# Patient Record
Sex: MALE | Race: WHITE | NOT HISPANIC OR LATINO | Employment: OTHER | URBAN - NONMETROPOLITAN AREA
[De-identification: names, ages, dates, MRNs, and addresses within clinical notes are randomized per-mention and may not be internally consistent; named-entity substitution may affect disease eponyms.]

---

## 2017-05-23 ENCOUNTER — OFFICE VISIT (OUTPATIENT)
Dept: CARDIOLOGY | Facility: CLINIC | Age: 72
End: 2017-05-23

## 2017-05-23 VITALS
HEART RATE: 72 BPM | DIASTOLIC BLOOD PRESSURE: 76 MMHG | WEIGHT: 174 LBS | SYSTOLIC BLOOD PRESSURE: 118 MMHG | HEIGHT: 72 IN | BODY MASS INDEX: 23.57 KG/M2

## 2017-05-23 DIAGNOSIS — Z98.890 S/P MITRAL VALVE REPAIR: ICD-10-CM

## 2017-05-23 DIAGNOSIS — E11.9 CONTROLLED TYPE 2 DIABETES MELLITUS WITHOUT COMPLICATION, WITHOUT LONG-TERM CURRENT USE OF INSULIN (HCC): ICD-10-CM

## 2017-05-23 DIAGNOSIS — E78.00 PURE HYPERCHOLESTEROLEMIA: ICD-10-CM

## 2017-05-23 DIAGNOSIS — I34.0 NON-RHEUMATIC MITRAL REGURGITATION: ICD-10-CM

## 2017-05-23 DIAGNOSIS — I10 ESSENTIAL HYPERTENSION: ICD-10-CM

## 2017-05-23 DIAGNOSIS — I34.2 NON-RHEUMATIC MITRAL VALVE STENOSIS: ICD-10-CM

## 2017-05-23 DIAGNOSIS — I25.10 CORONARY ARTERY DISEASE INVOLVING NATIVE CORONARY ARTERY OF NATIVE HEART WITHOUT ANGINA PECTORIS: Primary | ICD-10-CM

## 2017-05-23 PROCEDURE — 99213 OFFICE O/P EST LOW 20 MIN: CPT | Performed by: NURSE PRACTITIONER

## 2017-05-23 RX ORDER — NITROGLYCERIN 0.4 MG/1
0.4 TABLET SUBLINGUAL
COMMUNITY
End: 2018-05-30 | Stop reason: SDUPTHER

## 2017-09-25 ENCOUNTER — TELEPHONE (OUTPATIENT)
Dept: CARDIOLOGY | Facility: CLINIC | Age: 72
End: 2017-09-25

## 2017-09-25 NOTE — TELEPHONE ENCOUNTER
Wife called concerned about patient's BP. Says that his BP has been running low for the past couple of weeks (102-110/40's-50's and HR in the 50's-60's). Says since his BP has been running lower he has been having some dizziness. He has not been taking the Toprol. Takes Imdur 30 mg BID and Maxzide 37.5-25 daily. Denies chest pain. Is asking for sooner appointment. Any recommendations?     [phone]

## 2017-09-25 NOTE — TELEPHONE ENCOUNTER
Spoke with wife. Advised to hold Maxzide at this time and monitor BP. Advised to call back in a few days and let us know how BP has been running.

## 2017-09-28 ENCOUNTER — TELEPHONE (OUTPATIENT)
Dept: CARDIOLOGY | Facility: CLINIC | Age: 72
End: 2017-09-28

## 2017-09-28 NOTE — TELEPHONE ENCOUNTER
Agree to stop Maxzide for now. Continue to monitor vital signs periodically and call for problems.

## 2017-09-28 NOTE — TELEPHONE ENCOUNTER
Received call from patient's spouse stating was instructed to hold Maxzide 9/25/17 due to low b/p and b/p has been coming up is better this am 119/ 55 pulse 55, wants to know if is to continue to hold maxzide? What are your recommendation's?     See telephone encounter 9/25/17.

## 2017-09-29 NOTE — TELEPHONE ENCOUNTER
Called and notified patient's spouse to stop Maxzide for now and continue to monitor vital sign's periodically and call our office with any problem's. Verbalizes understanding.

## 2017-11-28 ENCOUNTER — OFFICE VISIT (OUTPATIENT)
Dept: CARDIOLOGY | Facility: CLINIC | Age: 72
End: 2017-11-28

## 2017-11-28 VITALS
HEART RATE: 56 BPM | DIASTOLIC BLOOD PRESSURE: 72 MMHG | WEIGHT: 179 LBS | HEIGHT: 72 IN | BODY MASS INDEX: 24.24 KG/M2 | SYSTOLIC BLOOD PRESSURE: 120 MMHG

## 2017-11-28 DIAGNOSIS — R42 DIZZINESS: ICD-10-CM

## 2017-11-28 DIAGNOSIS — E78.49 OTHER HYPERLIPIDEMIA: ICD-10-CM

## 2017-11-28 DIAGNOSIS — E11.9 TYPE 2 DIABETES MELLITUS WITHOUT COMPLICATION, WITHOUT LONG-TERM CURRENT USE OF INSULIN (HCC): ICD-10-CM

## 2017-11-28 DIAGNOSIS — I25.118 CORONARY ARTERY DISEASE INVOLVING NATIVE CORONARY ARTERY OF NATIVE HEART WITH OTHER FORM OF ANGINA PECTORIS (HCC): ICD-10-CM

## 2017-11-28 DIAGNOSIS — R00.2 PALPITATIONS: ICD-10-CM

## 2017-11-28 DIAGNOSIS — I25.6 SILENT MYOCARDIAL ISCHEMIA: ICD-10-CM

## 2017-11-28 DIAGNOSIS — I49.3 PVC (PREMATURE VENTRICULAR CONTRACTION): ICD-10-CM

## 2017-11-28 DIAGNOSIS — I20.8 ANGINAL EQUIVALENT (HCC): Primary | ICD-10-CM

## 2017-11-28 PROCEDURE — 99214 OFFICE O/P EST MOD 30 MIN: CPT | Performed by: NURSE PRACTITIONER

## 2017-11-28 PROCEDURE — 93000 ELECTROCARDIOGRAM COMPLETE: CPT | Performed by: NURSE PRACTITIONER

## 2017-11-28 NOTE — PROGRESS NOTES
Chief Complaint   Patient presents with   • Follow-up     cardiac managment, patient reports b/p has been dropping too low during the day and going up at night.  reports some better since maxide discontinued but is having  some edema at times.   • Chest Pain     reports chest pressure that goes up chest and into shoulder's and head especially with sudden position changes. Patient has sleep apnea but does not wear C-Pap at night.        Cardiac Complaints  chest pressure/discomfort and dyspnea, dizziness      Subjective   Burak Balbuena is a 72 y.o. male with HTN,DM,  mitral valve disease, and  IHD diagnosed in 2003 when he had stenting.  This was followed by mitral valve repair in 2004. His most recent stress in 2011 was negative and no ischemia was noted.  In November 2016 echocardiogram showed stable mitral valve repair.  He returns today for follow up and states he has been continuing to have issues with his blood pressure dropping low during the day sometimes and often high at night.  He and his wife report since maxide has been discontinued a bit more edema has been noticed but nothing severe.  He does have issues with chest pressure that he feels goes into his chest and shoulders and often feels worse with certain position.  He continues to have shortness of breath with exertion but no worse than prior and he does not wear CPAP at night.  Palpitations are denied.  Labs and refills he reports with PCP.      Cardiac History  Past Surgical History:   Procedure Laterality Date   • CARDIAC CATHETERIZATION  01/09/2004    EF 65%, 4 +MR, patent stent LCX   • CARDIAC CATHETERIZATION  01/19/2004    80% LCX- 2.5x13 & 2.5x8 Pixel Stents   • CARDIOVASCULAR STRESS TEST  12/12/2011    EF 65%, 6 min 7 sec, 78% THR, 160/62, no ischemia   • CORONARY ARTERY BYPASS GRAFT  03/08/2004    s/p mitral valve repair   • ECHO - CONVERTED  12/12/2011    EF 60-65%, MVA 1.7, RSVP 39   • ECHO - CONVERTED  11/08/2016    EF 60-65%, mitral  annular ring in place, Mild to mod MS, mild MR       Current Outpatient Prescriptions   Medication Sig Dispense Refill   • aspirin 81 MG EC tablet Take 81 mg by mouth Daily.     • atorvastatin (LIPITOR) 20 MG tablet Take 20 mg by mouth Daily.     • diazePAM (VALIUM) 5 MG tablet Take 5 mg by mouth At Night As Needed for anxiety.     • isosorbide mononitrate (IMDUR) 30 MG 24 hr tablet Take 30 mg by mouth 2 (Two) Times a Day.     • metFORMIN (GLUCOPHAGE) 1000 MG tablet Take 1,000 mg by mouth 2 (Two) Times a Day With Meals.     • metoprolol succinate XL (TOPROL-XL) 50 MG 24 hr tablet Take 50 mg by mouth Daily As Needed.     • nitroglycerin (NITROSTAT) 0.4 MG SL tablet Place 0.4 mg under the tongue Every 5 (Five) Minutes As Needed for Chest Pain. Take no more than 3 doses in 15 minutes.     • omeprazole (priLOSEC) 20 MG capsule Take 20 mg by mouth Daily.     • piroxicam (FELDENE) 20 MG capsule Take 20 mg by mouth Daily.     • potassium chloride (K-DUR) 10 MEQ CR tablet Take 10 mEq by mouth Daily.     • triamterene-hydrochlorothiazide (MAXZIDE-25) 37.5-25 MG per tablet Take 1 tablet by mouth Daily.       No current facility-administered medications for this visit.        Phenergan [promethazine hcl]    Past Medical History:   Diagnosis Date   • CAD (coronary artery disease)    • Diabetes Mellitus    • Hx of Intravertebral disc disease    • Hypertension    • MR (congenital mitral regurgitation)    • Overnight pulse oximetry 02/20/2009    Increase O2, sleep study.   • Robotic Prostatectomy    • Sleep study- [place] 03/04/2009       Social History     Social History   • Marital status:      Spouse name: N/A   • Number of children: N/A   • Years of education: N/A     Occupational History   • Not on file.     Social History Main Topics   • Smoking status: Never Smoker   • Smokeless tobacco: Never Used   • Alcohol use Yes      Comment: rarely   • Drug use: No   • Sexual activity: Not on file     Other Topics Concern   • Not  "on file     Social History Narrative       Family History   Problem Relation Age of Onset   • Heart attack Mother    • Heart disease Mother    • Diabetes Mother    • Cancer Father    • Heart attack Brother    • Heart disease Brother        Review of Systems   Constitution: Negative for weakness and malaise/fatigue.   Cardiovascular: Positive for chest pain and dyspnea on exertion. Negative for irregular heartbeat, near-syncope, palpitations and syncope.   Respiratory: Positive for shortness of breath. Negative for wheezing.    Musculoskeletal: Negative for arthritis and back pain.   Gastrointestinal: Negative for anorexia, heartburn and nausea.   Genitourinary: Negative for dysuria, hesitancy and nocturia.   Neurological: Positive for dizziness. Negative for light-headedness and loss of balance.   Psychiatric/Behavioral: Negative for altered mental status and depression.       DiabetesYes  Thyroidnormal    Objective     /72 (BP Location: Left arm)  Pulse 56  Ht 72\" (182.9 cm)  Wt 179 lb (81.2 kg)  BMI 24.28 kg/m2    Physical Exam   Constitutional: He is oriented to person, place, and time. He appears well-developed and well-nourished.   HENT:   Head: Normocephalic and atraumatic.   Eyes: EOM are normal. Pupils are equal, round, and reactive to light.   Neck: Normal range of motion. Neck supple.   Cardiovascular: Normal rate.  A regularly irregular rhythm present.   Murmur heard.  Pulmonary/Chest: Effort normal and breath sounds normal.   Abdominal: Soft.   Musculoskeletal: Normal range of motion.   Neurological: He is alert and oriented to person, place, and time.   Skin: Skin is warm and dry.   Psychiatric: He has a normal mood and affect. His behavior is normal.         ECG 12 Lead  Date/Time: 11/28/2017 2:06 PM  Performed by: ANA LILIA BURGOS  Authorized by: ANA LILIA BURGOS   Rhythm: sinus rhythm  Ectopy: frequent PVCs  BPM: 70  Clinical impression: abnormal ECG            Assessment/Plan     HR is " slightly bradycardic today at 56.  BP is stable at 120/72.  He was advised to continue to hold his maxzide therapy for now as his blood pressure has been doing well without it.  If it should start to drop again, patient advised to call so medication changes may be considered. Repeat cardiac workup with stress and echo will be advised today to assess for any silent ischemic burden, any runs of SVT, to assess LV function, and MVA. Holter monitor will be advised to assess percentage of PVCs as patient does have PVCs on EKG in office that was done today done for irregular pulse.  It was noted, he does not perfuse with PVCS, and continues to report dizziness some of the time.  More recommendations to follow. Lab order will be given with more recommendations to follow. No refills are needed at present. Good cardiac diet and activity as tolerated advised.  6 month follow up advised or sooner if needed.      Problems Addressed this Visit        Cardiovascular and Mediastinum    Hyperlipemia    Relevant Orders    Lipid Panel    CAD (coronary artery disease)    Relevant Orders    Stress Test With Myocardial Perfusion One Day    Adult Transthoracic Echo Complete W/ Cont if Necessary Per Protocol    TSH    Comprehensive Metabolic Panel    Magnesium    CBC & Differential    Lipid Panel       Endocrine    Diabetes    Relevant Orders    Stress Test With Myocardial Perfusion One Day    Adult Transthoracic Echo Complete W/ Cont if Necessary Per Protocol    TSH    Magnesium    CBC & Differential    Lipid Panel    Hemoglobin A1c      Other Visit Diagnoses     Anginal equivalent    -  Primary    Relevant Orders    Stress Test With Myocardial Perfusion One Day    Adult Transthoracic Echo Complete W/ Cont if Necessary Per Protocol    Hemoglobin A1c    Silent myocardial ischemia        Relevant Orders    Stress Test With Myocardial Perfusion One Day    Adult Transthoracic Echo Complete W/ Cont if Necessary Per Protocol    TSH     Comprehensive Metabolic Panel    Magnesium    CBC & Differential    Lipid Panel    PVC (premature ventricular contraction)        Relevant Orders    TSH    Comprehensive Metabolic Panel    Magnesium    Holter Monitor - 24 Hour    ECG 12 Lead    Palpitations        Relevant Orders    Holter Monitor - 24 Hour                  Electronically signed by DEONTE Hilton November 28, 2017 4:33 PM

## 2017-12-06 ENCOUNTER — HOSPITAL ENCOUNTER (OUTPATIENT)
Dept: CARDIOLOGY | Facility: HOSPITAL | Age: 72
Discharge: HOME | End: 2017-12-06

## 2017-12-06 ENCOUNTER — OUTSIDE FACILITY SERVICE (OUTPATIENT)
Dept: CARDIOLOGY | Facility: CLINIC | Age: 72
End: 2017-12-06

## 2017-12-06 DIAGNOSIS — I25.6 SILENT MYOCARDIAL ISCHEMIA: ICD-10-CM

## 2017-12-06 DIAGNOSIS — I25.118 CORONARY ARTERY DISEASE INVOLVING NATIVE CORONARY ARTERY OF NATIVE HEART WITH OTHER FORM OF ANGINA PECTORIS (HCC): ICD-10-CM

## 2017-12-06 DIAGNOSIS — E11.9 TYPE 2 DIABETES MELLITUS WITHOUT COMPLICATION, WITHOUT LONG-TERM CURRENT USE OF INSULIN (HCC): ICD-10-CM

## 2017-12-06 DIAGNOSIS — I20.8 ANGINAL EQUIVALENT (HCC): ICD-10-CM

## 2017-12-06 LAB
MAXIMAL PREDICTED HEART RATE: 146 BPM
MAXIMAL PREDICTED HEART RATE: 146 BPM
STRESS TARGET HR: 124 BPM
STRESS TARGET HR: 124 BPM

## 2017-12-06 PROCEDURE — A9500 TC99M SESTAMIBI: HCPCS | Performed by: INTERNAL MEDICINE

## 2017-12-06 PROCEDURE — 0 TECHNETIUM SESTAMIBI: Performed by: INTERNAL MEDICINE

## 2017-12-06 PROCEDURE — 93017 CV STRESS TEST TRACING ONLY: CPT

## 2017-12-06 PROCEDURE — 93306 TTE W/DOPPLER COMPLETE: CPT

## 2017-12-06 PROCEDURE — 93306 TTE W/DOPPLER COMPLETE: CPT | Performed by: INTERNAL MEDICINE

## 2017-12-06 PROCEDURE — 78452 HT MUSCLE IMAGE SPECT MULT: CPT

## 2017-12-06 PROCEDURE — 93018 CV STRESS TEST I&R ONLY: CPT | Performed by: INTERNAL MEDICINE

## 2017-12-06 PROCEDURE — 78452 HT MUSCLE IMAGE SPECT MULT: CPT | Performed by: INTERNAL MEDICINE

## 2017-12-06 RX ADMIN — TECHNETIUM TC-99M SESTAMIBI 1 DOSE: 1 INJECTION INTRAVENOUS at 08:50

## 2017-12-06 RX ADMIN — TECHNETIUM TC-99M SESTAMIBI 1 DOSE: 1 INJECTION INTRAVENOUS at 13:30

## 2017-12-07 ENCOUNTER — TELEPHONE (OUTPATIENT)
Dept: CARDIOLOGY | Facility: CLINIC | Age: 72
End: 2017-12-07

## 2017-12-07 NOTE — TELEPHONE ENCOUNTER
I spoke with patient's wife, Jaimee.  Aware of stress test and echo results and recommendations.    Negative for ischemia, normal LV function.  Continue home medications.

## 2017-12-18 ENCOUNTER — OUTSIDE FACILITY SERVICE (OUTPATIENT)
Dept: CARDIOLOGY | Facility: CLINIC | Age: 72
End: 2017-12-18

## 2017-12-18 PROCEDURE — 93227 XTRNL ECG REC<48 HR R&I: CPT | Performed by: INTERNAL MEDICINE

## 2018-01-16 ENCOUNTER — TELEPHONE (OUTPATIENT)
Dept: CARDIOLOGY | Facility: CLINIC | Age: 73
End: 2018-01-16

## 2018-01-16 DIAGNOSIS — E83.42 HYPOMAGNESEMIA: Primary | ICD-10-CM

## 2018-01-16 RX ORDER — SOTALOL HYDROCHLORIDE 80 MG/1
40 TABLET ORAL EVERY 12 HOURS SCHEDULED
Qty: 45 TABLET | Refills: 1 | Status: SHIPPED | OUTPATIENT
Start: 2018-01-16 | End: 2018-05-30 | Stop reason: SINTOL

## 2018-01-16 NOTE — TELEPHONE ENCOUNTER
Talked with Dr. Sotomayor.  He said for him to switch his metoprolol to sotalol at 40mg BID.  Have him come back on Thursday for EKG and blood pressure check.  I will also send order to our lab for magnesium.

## 2018-01-16 NOTE — TELEPHONE ENCOUNTER
"Patients wife Jaimee called concerning results of holter monitor, she reports B/P has been low, yesterday 133/47 and he is still having episodes of \"quick beats\".  What is your recommendations? Thanks   "

## 2018-01-16 NOTE — TELEPHONE ENCOUNTER
Patients wife Jaimee made aware of recommendations to switch Metoprolol to Sotalol 40mg bid, have magnesium level, and EKG with B/P check on Thursday 1/18/18 at 12, Jaimee verbalized understanding and requesting lab order be sent to [facility] lab.

## 2018-01-18 ENCOUNTER — TELEPHONE (OUTPATIENT)
Dept: CARDIOLOGY | Facility: CLINIC | Age: 73
End: 2018-01-18

## 2018-01-18 ENCOUNTER — CLINICAL SUPPORT (OUTPATIENT)
Dept: CARDIOLOGY | Facility: CLINIC | Age: 73
End: 2018-01-18

## 2018-01-18 DIAGNOSIS — I49.3 PVC (PREMATURE VENTRICULAR CONTRACTION): Primary | ICD-10-CM

## 2018-01-18 PROCEDURE — 93000 ELECTROCARDIOGRAM COMPLETE: CPT | Performed by: NURSE PRACTITIONER

## 2018-01-18 NOTE — TELEPHONE ENCOUNTER
EKG looks good.  Appears to be a NSR with some sinus arrhythmia/junctional beats.  For now, continue with current sotalol dosing.  Please encourage on follow up at lab for repeat MAG.

## 2018-01-18 NOTE — PROGRESS NOTES
Procedure     ECG 12 Lead  Date/Time: 1/18/2018 3:19 PM  Performed by: ANA LILIA BURGOS  Authorized by: ANA LILIA BURGOS   Rhythm: sinus rhythm  BPM: 64  Clinical impression: abnormal ECG and dysrhythmia - atrial  Comments: Sinus arrythmia

## 2018-01-18 NOTE — TELEPHONE ENCOUNTER
Patient wife aware of EKG results and to continue same Sotalol dose.   Patient had blood drawn this afternoon

## 2018-02-14 ENCOUNTER — TELEPHONE (OUTPATIENT)
Dept: CARDIOLOGY | Facility: CLINIC | Age: 73
End: 2018-02-14

## 2018-02-14 NOTE — TELEPHONE ENCOUNTER
Patients wife Jaimee called requesting most recent labs, stress, and echo results be sent to PCP. Copy of labs, stress and echo sent to PCP.

## 2018-05-30 ENCOUNTER — OFFICE VISIT (OUTPATIENT)
Dept: CARDIOLOGY | Facility: CLINIC | Age: 73
End: 2018-05-30

## 2018-05-30 ENCOUNTER — TELEPHONE (OUTPATIENT)
Dept: CARDIOLOGY | Facility: CLINIC | Age: 73
End: 2018-05-30

## 2018-05-30 VITALS
BODY MASS INDEX: 25.06 KG/M2 | HEIGHT: 72 IN | DIASTOLIC BLOOD PRESSURE: 68 MMHG | WEIGHT: 185 LBS | HEART RATE: 70 BPM | SYSTOLIC BLOOD PRESSURE: 100 MMHG

## 2018-05-30 DIAGNOSIS — E78.00 PURE HYPERCHOLESTEROLEMIA: ICD-10-CM

## 2018-05-30 DIAGNOSIS — I49.3 PVC (PREMATURE VENTRICULAR CONTRACTION): ICD-10-CM

## 2018-05-30 DIAGNOSIS — Z79.899 MEDICATION MANAGEMENT: ICD-10-CM

## 2018-05-30 DIAGNOSIS — I25.10 CORONARY ARTERY DISEASE INVOLVING NATIVE CORONARY ARTERY OF NATIVE HEART WITHOUT ANGINA PECTORIS: Primary | ICD-10-CM

## 2018-05-30 DIAGNOSIS — I10 ESSENTIAL HYPERTENSION: ICD-10-CM

## 2018-05-30 DIAGNOSIS — I49.1 PAC (PREMATURE ATRIAL CONTRACTION): ICD-10-CM

## 2018-05-30 DIAGNOSIS — Z98.890 S/P MITRAL VALVE REPAIR: ICD-10-CM

## 2018-05-30 DIAGNOSIS — R01.1 CARDIAC MURMUR: ICD-10-CM

## 2018-05-30 PROCEDURE — 93000 ELECTROCARDIOGRAM COMPLETE: CPT | Performed by: NURSE PRACTITIONER

## 2018-05-30 PROCEDURE — 99213 OFFICE O/P EST LOW 20 MIN: CPT | Performed by: NURSE PRACTITIONER

## 2018-05-30 RX ORDER — UBIDECARENONE 100 MG
100 CAPSULE ORAL DAILY
Start: 2018-05-30 | End: 2018-11-29 | Stop reason: ALTCHOICE

## 2018-05-30 RX ORDER — NITROGLYCERIN 0.4 MG/1
0.4 TABLET SUBLINGUAL
Qty: 25 TABLET | Refills: 1 | Status: SHIPPED | OUTPATIENT
Start: 2018-05-30 | End: 2022-02-24 | Stop reason: SDUPTHER

## 2018-05-30 NOTE — PATIENT INSTRUCTIONS
Nitroglycerin sublingual tablets  What is this medicine?  NITROGLYCERIN (jason troe GLI ser in) is a type of vasodilator. It relaxes blood vessels, increasing the blood and oxygen supply to your heart. This medicine is used to relieve chest pain caused by angina. It is also used to prevent chest pain before activities like climbing stairs, going outdoors in cold weather, or sexual activity.  This medicine may be used for other purposes; ask your health care provider or pharmacist if you have questions.  COMMON BRAND NAME(S): Nitroquick, Nitrostat, Nitrotab  What should I tell my health care provider before I take this medicine?  They need to know if you have any of these conditions:  -anemia  -head injury, recent stroke, or bleeding in the brain  -liver disease  -previous heart attack  -an unusual or allergic reaction to nitroglycerin, other medicines, foods, dyes, or preservatives  -pregnant or trying to get pregnant  -breast-feeding  How should I use this medicine?  Take this medicine by mouth as needed. At the first sign of an angina attack (chest pain or tightness) place one tablet under your tongue. You can also take this medicine 5 to 10 minutes before an event likely to produce chest pain. Follow the directions on the prescription label. Let the tablet dissolve under the tongue. Do not swallow whole. Replace the dose if you accidentally swallow it. It will help if your mouth is not dry. Saliva around the tablet will help it to dissolve more quickly. Do not eat or drink, smoke or chew tobacco while a tablet is dissolving. If you are not better within 5 minutes after taking ONE dose of nitroglycerin, call 9-1-1 immediately to seek emergency medical care. Do not take more than 3 nitroglycerin tablets over 15 minutes.  If you take this medicine often to relieve symptoms of angina, your doctor or health care professional may provide you with different instructions to manage your symptoms. If symptoms do not go away  after following these instructions, it is important to call 9-1-1 immediately. Do not take more than 3 nitroglycerin tablets over 15 minutes.  Talk to your pediatrician regarding the use of this medicine in children. Special care may be needed.  Overdosage: If you think you have taken too much of this medicine contact a poison control center or emergency room at once.  NOTE: This medicine is only for you. Do not share this medicine with others.  What if I miss a dose?  This does not apply. This medicine is only used as needed.  What may interact with this medicine?  Do not take this medicine with any of the following medications:  -certain migraine medicines like ergotamine and dihydroergotamine (DHE)  -medicines used to treat erectile dysfunction like sildenafil, tadalafil, and vardenafil  -riociguat  This medicine may also interact with the following medications:  -alteplase  -aspirin  -heparin  -medicines for high blood pressure  -medicines for mental depression  -other medicines used to treat angina  -phenothiazines like chlorpromazine, mesoridazine, prochlorperazine, thioridazine  This list may not describe all possible interactions. Give your health care provider a list of all the medicines, herbs, non-prescription drugs, or dietary supplements you use. Also tell them if you smoke, drink alcohol, or use illegal drugs. Some items may interact with your medicine.  What should I watch for while using this medicine?  Tell your doctor or health care professional if you feel your medicine is no longer working.  Keep this medicine with you at all times. Sit or lie down when you take your medicine to prevent falling if you feel dizzy or faint after using it. Try to remain calm. This will help you to feel better faster. If you feel dizzy, take several deep breaths and lie down with your feet propped up, or bend forward with your head resting between your knees.  You may get drowsy or dizzy. Do not drive, use machinery,  or do anything that needs mental alertness until you know how this drug affects you. Do not stand or sit up quickly, especially if you are an older patient. This reduces the risk of dizzy or fainting spells. Alcohol can make you more drowsy and dizzy. Avoid alcoholic drinks.  Do not treat yourself for coughs, colds, or pain while you are taking this medicine without asking your doctor or health care professional for advice. Some ingredients may increase your blood pressure.  What side effects may I notice from receiving this medicine?  Side effects that you should report to your doctor or health care professional as soon as possible:  -blurred vision  -dry mouth  -skin rash  -sweating  -the feeling of extreme pressure in the head  -unusually weak or tired  Side effects that usually do not require medical attention (report to your doctor or health care professional if they continue or are bothersome):  -flushing of the face or neck  -headache  -irregular heartbeat, palpitations  -nausea, vomiting  This list may not describe all possible side effects. Call your doctor for medical advice about side effects. You may report side effects to FDA at 5-752-FDA-1332.  Where should I keep my medicine?  Keep out of the reach of children.  Store at room temperature between 20 and 25 degrees C (68 and 77 degrees F). Store in original container. Protect from light and moisture. Keep tightly closed. Throw away any unused medicine after the expiration date.  NOTE: This sheet is a summary. It may not cover all possible information. If you have questions about this medicine, talk to your doctor, pharmacist, or health care provider.  © 2018 [employer] (2014-10-16 17:57:36)

## 2018-05-30 NOTE — PROGRESS NOTES
Chief Complaint   Patient presents with   • Follow-up     For cardiac management. PCP refills meds. Labs per our office 6 months ago. Stopped taking Sotalol about 5 months ago due to side effects. Didn't bring med list- went over verbally.        Subjective       Burak Balbuena is a 73 y.o. male with HTN, DM, mitral valve disease, and  IHD diagnosed in 2003 when he had stenting.  This was followed by mitral valve repair in 2004. In 2011, stress test was negative for ischemia.  In November 2016, echocardiogram showed stable mitral valve repair. At his December 2017 visit, he reported issues with blood pressure management, shortness of breath and some edema. Holter report 11/17 showed sinus with PVCs noted. Sotalol added. A repeat stress and echocardiogram were done on 12/6/17. No ischemia was noted and effort tolerance normal. LVEF was 60-65% and mitral annular ring stable. RVSP 38 mmHg. Recommendation to continue medical management. He reports he was not able to tolerate Sotalol and it was stopped. No reoccurrence of palpitations reported.   Today he comes to the office for a follow up visit and denies chest pain, palpitations, dizziness or worsening shortness of breath. He states he has more fatigue than he used to have but attributes more to age. He continues his normal activities without issues. He decided to not grow a garden this year but still maintains yard work and wood working.     HPI     Cardiac History:    Past Surgical History:   Procedure Laterality Date   • CARDIAC CATHETERIZATION  01/09/2004    EF 65%, 4 +MR, patent stent LCX   • CARDIAC CATHETERIZATION  01/19/2004    80% LCX- 2.5x13 & 2.5x8 Pixel Stents   • CARDIOVASCULAR STRESS TEST  12/12/2011    EF 65%, 6 min 7 sec, 78% THR, 160/62, no ischemia   • CARDIOVASCULAR STRESS TEST  12/06/2017    8 Min,10 Secs. 82% THR. BP- 188/61. Negative   • CORONARY ARTERY BYPASS GRAFT  03/08/2004    s/p mitral valve repair   • ECHO - CONVERTED  12/12/2011    EF  60-65%, MVA 1.7, RSVP 39   • ECHO - CONVERTED  11/08/2016    EF 60-65%, mitral annular ring in place, Mild to mod MS, mild MR   • ECHO - CONVERTED  12/06/2017    EF 65%. Mild MS & MR. RVSP- 38 mmHg       Current Outpatient Prescriptions   Medication Sig Dispense Refill   • aspirin 81 MG EC tablet Take 81 mg by mouth Daily.     • atorvastatin (LIPITOR) 20 MG tablet Take 20 mg by mouth Daily.     • diazePAM (VALIUM) 5 MG tablet Take 5 mg by mouth At Night As Needed for anxiety.     • isosorbide mononitrate (IMDUR) 30 MG 24 hr tablet Take 30 mg by mouth 2 (Two) Times a Day.     • metFORMIN (GLUCOPHAGE) 1000 MG tablet Take 1,000 mg by mouth 2 (Two) Times a Day With Meals.     • nitroglycerin (NITROSTAT) 0.4 MG SL tablet Place 1 tablet under the tongue Every 5 (Five) Minutes As Needed for Chest Pain. Take no more than 3 doses in 15 minutes. 25 tablet 1   • omeprazole (priLOSEC) 20 MG capsule Take 20 mg by mouth Daily.     • piroxicam (FELDENE) 20 MG capsule Take 20 mg by mouth Daily.     • potassium chloride (K-DUR) 10 MEQ CR tablet Take 10 mEq by mouth Daily.     • triamterene-hydrochlorothiazide (MAXZIDE-25) 37.5-25 MG per tablet Take 1 tablet by mouth Daily As Needed.     • coenzyme Q10 100 MG capsule Take 1 capsule by mouth Daily.       No current facility-administered medications for this visit.        Phenergan [promethazine hcl]    Past Medical History:   Diagnosis Date   • CAD (coronary artery disease)    • Diabetes Mellitus    • Hx of Intravertebral disc disease    • Hypertension    • MR (congenital mitral regurgitation)    • Overnight pulse oximetry 02/20/2009    Increase O2, sleep study.   • Robotic Prostatectomy    • Sleep study- [place] 03/04/2009       Social History     Social History   • Marital status:      Spouse name: N/A   • Number of children: N/A   • Years of education: N/A     Occupational History   • Not on file.     Social History Main Topics   • Smoking status: Never Smoker   • Smokeless  "tobacco: Never Used   • Alcohol use Yes      Comment: rarely   • Drug use: No   • Sexual activity: Not on file     Other Topics Concern   • Not on file     Social History Narrative   • No narrative on file       Family History   Problem Relation Age of Onset   • Heart attack Mother    • Heart disease Mother    • Diabetes Mother    • Cancer Father    • Heart attack Brother    • Heart disease Brother        Review of Systems   Constitution: Positive for malaise/fatigue (no worse). Negative for decreased appetite and weakness.   HENT: Negative for congestion, hoarse voice, nosebleeds and sore throat.    Eyes: Negative for blurred vision.   Cardiovascular: Negative for chest pain, leg swelling, near-syncope, orthopnea, palpitations and paroxysmal nocturnal dyspnea.   Respiratory: Negative for shortness of breath, sleep disturbances due to breathing and snoring.    Endocrine: Negative for cold intolerance and heat intolerance.   Hematologic/Lymphatic: Negative for bleeding problem. Does not bruise/bleed easily.   Skin: Negative for color change, dry skin and itching.   Musculoskeletal: Negative for muscle cramps and myalgias.   Gastrointestinal: Negative for abdominal pain, change in bowel habit, dysphagia, heartburn, melena and nausea.   Genitourinary: Negative for dysuria and hematuria.   Neurological: Negative for dizziness, headaches and light-headedness.   Psychiatric/Behavioral: Negative for altered mental status and memory loss. The patient does not have insomnia.    Allergic/Immunologic: Positive for environmental allergies. Negative for hives.        Objective     /68   Pulse 70   Ht 182.9 cm (72\")   Wt 83.9 kg (185 lb)   BMI 25.09 kg/m²     Physical Exam   Constitutional: He is oriented to person, place, and time. Vital signs are normal. He appears well-developed and well-nourished. He does not appear ill. No distress.   HENT:   Head: Normocephalic.   Eyes: Pupils are equal, round, and reactive to " light.   Neck: Normal range of motion. Neck supple. No JVD present. Carotid bruit is not present.   Cardiovascular: Normal rate, regular rhythm, S1 normal and S2 normal.    No murmur heard.  Pulmonary/Chest: Effort normal and breath sounds normal. He has no wheezes. He has no rales.   Abdominal: Soft. Bowel sounds are normal. He exhibits no distension. There is no tenderness.   Musculoskeletal: Normal range of motion. He exhibits no edema or tenderness.   Neurological: He is alert and oriented to person, place, and time.   Skin: Skin is warm and dry.   Psychiatric: He has a normal mood and affect.          ECG 12 Lead  Date/Time: 5/30/2018 2:11 PM  Performed by: CATHIE BEST  Authorized by: CATHIE BEST   Comparison: compared with previous ECG from 2/2/2018  Comparison to previous ECG: Sinus arrhythmia   Rhythm: sinus rhythm  Ectopy: atrial premature contractions  Rate: normal  BPM: 70  Comments: One PAC noted            Assessment/Plan      Burak was seen today for follow-up.    Diagnoses and all orders for this visit:    Coronary artery disease involving native coronary artery of native heart without angina pectoris    Essential hypertension    Pure hypercholesterolemia    S/P mitral valve repair    Cardiac murmur    PAC (premature atrial contraction)    PVC (premature ventricular contraction)    Other orders  -     nitroglycerin (NITROSTAT) 0.4 MG SL tablet; Place 1 tablet under the tongue Every 5 (Five) Minutes As Needed for Chest Pain. Take no more than 3 doses in 15 minutes.  -     coenzyme Q10 100 MG capsule; Take 1 capsule by mouth Daily.  -     ECG 12 Lead    Agree for him to continue one a day vitamin for men. He stopped statin about a month ago due to myalgia, which has since improved. Recommend adding Co Q 10 and restart statin. He has plans to follow up with you in near future for repeat labs.     Patient's Body mass index is 25.09 kg/m². BMI is within normal parameters. No follow-up required.  Heart healthy diet encouraged. He understands to avoid caffeine and maintain adequate hydration.     The reports of his most recent Holter monitor, stress and echo reviewed. No ischemia was noted. He had tried Sotalol for management of PVCs as noted on Holter report, but was not able to tolerate. EKG today shows sinus with one PAC noted. He denies palpitations or worsening cardiac symptoms. We will continue with current medication regimen. He takes Maxzide only as needed for increased blood pressure, about once a week according to patient. He remains off Metoprolol. I did not advise restarting due to improved symptoms and vital signs normal.   Mitral valve was stable per echo. No further cardiac testing warranted at this time. Should symptoms or problems develop he understands to call. A 6 month follow up visit scheduled.            Electronically signed by DEONTE Hunter,  May 30, 2018 2:16 PM

## 2018-11-29 ENCOUNTER — OFFICE VISIT (OUTPATIENT)
Dept: CARDIOLOGY | Facility: CLINIC | Age: 73
End: 2018-11-29

## 2018-11-29 VITALS
SYSTOLIC BLOOD PRESSURE: 118 MMHG | WEIGHT: 174 LBS | DIASTOLIC BLOOD PRESSURE: 64 MMHG | BODY MASS INDEX: 23.57 KG/M2 | HEIGHT: 72 IN | HEART RATE: 68 BPM

## 2018-11-29 DIAGNOSIS — I10 ESSENTIAL HYPERTENSION: ICD-10-CM

## 2018-11-29 DIAGNOSIS — I49.3 PVC (PREMATURE VENTRICULAR CONTRACTION): ICD-10-CM

## 2018-11-29 DIAGNOSIS — E78.00 PURE HYPERCHOLESTEROLEMIA: ICD-10-CM

## 2018-11-29 DIAGNOSIS — E11.9 TYPE 2 DIABETES MELLITUS WITHOUT COMPLICATION, WITHOUT LONG-TERM CURRENT USE OF INSULIN (HCC): ICD-10-CM

## 2018-11-29 DIAGNOSIS — I25.10 CORONARY ARTERY DISEASE INVOLVING NATIVE CORONARY ARTERY OF NATIVE HEART WITHOUT ANGINA PECTORIS: Primary | ICD-10-CM

## 2018-11-29 DIAGNOSIS — R01.1 CARDIAC MURMUR: ICD-10-CM

## 2018-11-29 DIAGNOSIS — Z98.890 S/P MITRAL VALVE REPAIR: ICD-10-CM

## 2018-11-29 DIAGNOSIS — I49.1 PAC (PREMATURE ATRIAL CONTRACTION): ICD-10-CM

## 2018-11-29 PROCEDURE — 99213 OFFICE O/P EST LOW 20 MIN: CPT | Performed by: NURSE PRACTITIONER

## 2018-11-29 RX ORDER — METOPROLOL SUCCINATE 50 MG/1
50 TABLET, EXTENDED RELEASE ORAL DAILY PRN
COMMUNITY
End: 2018-11-29 | Stop reason: SINTOL

## 2019-05-29 ENCOUNTER — OFFICE VISIT (OUTPATIENT)
Dept: CARDIOLOGY | Facility: CLINIC | Age: 74
End: 2019-05-29

## 2019-05-29 VITALS
HEART RATE: 74 BPM | DIASTOLIC BLOOD PRESSURE: 72 MMHG | SYSTOLIC BLOOD PRESSURE: 140 MMHG | BODY MASS INDEX: 23.49 KG/M2 | WEIGHT: 173.4 LBS | HEIGHT: 72 IN

## 2019-05-29 DIAGNOSIS — I25.10 CORONARY ARTERY DISEASE INVOLVING NATIVE CORONARY ARTERY OF NATIVE HEART WITHOUT ANGINA PECTORIS: Primary | ICD-10-CM

## 2019-05-29 DIAGNOSIS — E78.00 PURE HYPERCHOLESTEROLEMIA: ICD-10-CM

## 2019-05-29 DIAGNOSIS — Z98.890 S/P MITRAL VALVE REPAIR: ICD-10-CM

## 2019-05-29 DIAGNOSIS — R01.1 HEART MURMUR: ICD-10-CM

## 2019-05-29 DIAGNOSIS — I10 ESSENTIAL HYPERTENSION: ICD-10-CM

## 2019-05-29 PROCEDURE — 99213 OFFICE O/P EST LOW 20 MIN: CPT | Performed by: NURSE PRACTITIONER

## 2019-05-29 RX ORDER — CHLORAL HYDRATE 500 MG
1000 CAPSULE ORAL
COMMUNITY
End: 2019-12-17 | Stop reason: ALTCHOICE

## 2019-05-29 NOTE — PROGRESS NOTES
Chief Complaint   Patient presents with   • Follow-up     for cardiac management . Has no cardiac complaints today    • Aspirin     Daily 81 mg dose       Subjective       Burak Balbuena is a 74 y.o. male  with HTN, DM, mitral valve disease, and IHD diagnosed in 2003 when he had stenting followed by mitral valve repair in 2004. In 2011, stress test was negative for ischemia. In November 2016, echocardiogram showed stable mitral valve repair. At his December 2017 visit, he reported difficulty with blood pressure, SOB and edema. Holter showed sinus with PVCs noted. Sotalol added. A repeat stress and echocardiogram were done on 12/6/17. No ischemia was noted and effort tolerance normal. LVEF was 60-65% and mitral annular ring stable. RVSP 38 mmHg. Recommendation to continue medical management. He reports he was not able to tolerate Sotalol and it was stopped. No reoccurrence of palpitations reported, therefore metoprolol not restarted.     Today he comes to the office for a follow up visit.  He denies chest pain, palpitations, shortness of breath.  He is maintaining his normal daily activities.  He has no longer taking potassium as he said most recent level was elevated.  No other medication changes are noted.    HPI     Cardiac History:    Past Surgical History:   Procedure Laterality Date   • CARDIAC CATHETERIZATION  01/09/2004    EF 65%, 4 +MR, patent stent LCX   • CARDIAC CATHETERIZATION  01/19/2004    80% LCX- 2.5x13 & 2.5x8 Pixel Stents   • CARDIOVASCULAR STRESS TEST  12/12/2011    EF 65%, 6 min 7 sec, 78% THR, 160/62, no ischemia   • CARDIOVASCULAR STRESS TEST  12/06/2017    8 Min,10 Secs. 82% THR. BP- 188/61. Negative   • CONVERTED (HISTORICAL) HOLTER  12/18/2017    Holter- 24 hr- avg 66, freq PVC, 4.5%   • CORONARY ARTERY BYPASS GRAFT  03/08/2004    s/p mitral valve repair   • ECHO - CONVERTED  12/12/2011    EF 60-65%, MVA 1.7, RSVP 39   • ECHO - CONVERTED  11/08/2016    EF 60-65%, mitral annular ring in  place, Mild to mod MS, mild MR   • ECHO - CONVERTED  12/06/2017    EF 65%. Mild MS & MR. RVSP- 38 mmHg       Current Outpatient Medications   Medication Sig Dispense Refill   • aspirin 81 MG EC tablet Take 81 mg by mouth Daily.     • atorvastatin (LIPITOR) 20 MG tablet Take 20 mg by mouth Daily.     • diazePAM (VALIUM) 5 MG tablet Take 5 mg by mouth At Night As Needed for anxiety.     • isosorbide mononitrate (IMDUR) 30 MG 24 hr tablet Take 30 mg by mouth 2 (Two) Times a Day.     • metFORMIN (GLUCOPHAGE) 1000 MG tablet Take 1,000 mg by mouth 2 (Two) Times a Day With Meals.     • Multiple Vitamins-Minerals (MENS MULTI VITAMIN & MINERAL) tablet Take 1 tablet by mouth Daily.     • nitroglycerin (NITROSTAT) 0.4 MG SL tablet Place 1 tablet under the tongue Every 5 (Five) Minutes As Needed for Chest Pain. Take no more than 3 doses in 15 minutes. 25 tablet 1   • Omega-3 Fatty Acids (FISH OIL) 1000 MG capsule capsule Take 1,000 mg by mouth Daily With Breakfast.     • omeprazole (priLOSEC) 20 MG capsule Take 20 mg by mouth Daily.     • piroxicam (FELDENE) 20 MG capsule Take 20 mg by mouth Daily.     • triamterene-hydrochlorothiazide (MAXZIDE-25) 37.5-25 MG per tablet Take 1 tablet by mouth Daily As Needed.       No current facility-administered medications for this visit.        Phenergan [promethazine hcl]    Past Medical History:   Diagnosis Date   • CAD (coronary artery disease)    • Diabetes Mellitus    • Hx of Intravertebral disc disease    • Hypertension    • MR (congenital mitral regurgitation)    • Overnight pulse oximetry 02/20/2009    Increase O2, sleep study.   • Robotic Prostatectomy    • Sleep study- [facility] 03/04/2009       Social History     Socioeconomic History   • Marital status:      Spouse name: Not on file   • Number of children: Not on file   • Years of education: Not on file   • Highest education level: Not on file   Tobacco Use   • Smoking status: Never Smoker   • Smokeless tobacco: Never Used  "  Substance and Sexual Activity   • Alcohol use: Yes     Comment: rarely   • Drug use: No       Family History   Problem Relation Age of Onset   • Heart attack Mother    • Heart disease Mother    • Diabetes Mother    • Cancer Father    • Heart attack Brother    • Heart disease Brother        Review of Systems   Constitution: Negative for decreased appetite and malaise/fatigue.   HENT: Negative for congestion, hoarse voice and nosebleeds.    Eyes: Negative for blurred vision and redness.   Cardiovascular: Negative for chest pain, leg swelling and palpitations.   Respiratory: Negative for cough and shortness of breath.    Endocrine: Negative for cold intolerance and heat intolerance.   Hematologic/Lymphatic: Does not bruise/bleed easily.   Skin: Negative for color change, dry skin and itching.   Musculoskeletal: Positive for joint pain (not a new problem). Negative for muscle cramps and myalgias.   Gastrointestinal: Negative for abdominal pain, change in bowel habit, dysphagia, heartburn, melena and nausea.   Genitourinary: Negative for dysuria and hematuria.   Neurological: Negative for dizziness and light-headedness.   Psychiatric/Behavioral: Negative for altered mental status. The patient does not have insomnia.         Objective     /72 (BP Location: Left arm)   Pulse 74   Ht 182.9 cm (72\")   Wt 78.7 kg (173 lb 6.4 oz)   BMI 23.52 kg/m²     Physical Exam   Constitutional: He is oriented to person, place, and time. He appears well-nourished.   HENT:   Head: Normocephalic.   Eyes: Pupils are equal, round, and reactive to light.   Neck: Normal range of motion.   Cardiovascular: Normal rate, regular rhythm, S1 normal and S2 normal.   Murmur heard.  Pulmonary/Chest: Breath sounds normal.   Abdominal: Soft. Bowel sounds are normal.   Musculoskeletal: Normal range of motion.   Neurological: He is alert and oriented to person, place, and time.   Skin: Skin is warm.   Psychiatric: He has a normal mood and affect. "      Procedures: none today        Assessment/Plan      Burak was seen today for follow-up and aspirin.    Diagnoses and all orders for this visit:    Coronary artery disease involving native coronary artery of native heart without angina pectoris    Essential hypertension    Pure hypercholesterolemia    Heart murmur    S/P mitral valve repair    Mr. Balbuena presents today without cardiac concerns or complaints.  His blood pressure, heart rate, and heart rhythm today are normal.  No change in cardiac medications made at this time.    For management of cholesterol he is currently on Lipitor without issues noted.  He will follow with you for lab orders/management.  Please forward a copy of lab results as available.    Patient's Body mass index is 23.52 kg/m². BMI is within normal parameters. No follow-up required.  Continue heart healthy diet.     His last cardiac work-up in 2017 was stable.  No repeat testing advised at this time as he remains asymptomatic.  Cardiac murmur appears stable.  A 6-month follow-up visit scheduled.  Please call sooner for any cardiac concerns.           Electronically signed by DEONTE Hunter,  May 31, 2019 11:30 AM

## 2019-12-17 ENCOUNTER — OFFICE VISIT (OUTPATIENT)
Dept: CARDIOLOGY | Facility: CLINIC | Age: 74
End: 2019-12-17

## 2019-12-17 VITALS
DIASTOLIC BLOOD PRESSURE: 72 MMHG | HEART RATE: 64 BPM | BODY MASS INDEX: 23.43 KG/M2 | WEIGHT: 173 LBS | SYSTOLIC BLOOD PRESSURE: 118 MMHG | HEIGHT: 72 IN

## 2019-12-17 DIAGNOSIS — R01.1 CARDIAC MURMUR: ICD-10-CM

## 2019-12-17 DIAGNOSIS — I10 ESSENTIAL HYPERTENSION: ICD-10-CM

## 2019-12-17 DIAGNOSIS — Z98.890 S/P MITRAL VALVE REPAIR: ICD-10-CM

## 2019-12-17 DIAGNOSIS — I25.10 CORONARY ARTERY DISEASE INVOLVING NATIVE CORONARY ARTERY OF NATIVE HEART WITHOUT ANGINA PECTORIS: Primary | ICD-10-CM

## 2019-12-17 DIAGNOSIS — E11.69 TYPE 2 DIABETES MELLITUS WITH OTHER SPECIFIED COMPLICATION, WITHOUT LONG-TERM CURRENT USE OF INSULIN (HCC): ICD-10-CM

## 2019-12-17 DIAGNOSIS — E78.2 MIXED HYPERLIPIDEMIA: ICD-10-CM

## 2019-12-17 DIAGNOSIS — R00.2 PALPITATIONS: ICD-10-CM

## 2019-12-17 PROCEDURE — 99213 OFFICE O/P EST LOW 20 MIN: CPT | Performed by: NURSE PRACTITIONER

## 2019-12-17 RX ORDER — POTASSIUM CHLORIDE 750 MG/1
10 TABLET, FILM COATED, EXTENDED RELEASE ORAL DAILY
COMMUNITY
End: 2020-12-22 | Stop reason: SINTOL

## 2019-12-17 RX ORDER — LISINOPRIL 10 MG/1
10 TABLET ORAL AS NEEDED
COMMUNITY

## 2019-12-17 NOTE — PROGRESS NOTES
Chief Complaint   Patient presents with   • Follow-up     For cardiac management. Patient is on aspirin. Last lab work was done about 3 months ago per PCP, not in chart.    • Med Refill     PCP does medication refills. Brought medication list with visit.        Cardiac Complaints  none      Subjective   Burak Balbuena is a 74 y.o. male with HTN, DM, hyperlipidemia, mitral valve disease, and IHD diagnosed in 2003 when he had stenting followed by mitral valve repair in 2004. In 2011, stress test was negative for ischemia. In November 2016, echocardiogram showed stable mitral valve repair. At his December 2017 visit, he reported difficulty with blood pressure, SOB and edema. Holter showed sinus with PVCs noted. Sotalol added. A repeat stress and echocardiogram were done on 12/6/17. No ischemia was noted and effort tolerance normal. LVEF was 60-65% and mitral annular ring stable. RVSP 38 mmHg. Recommendation to continue medical management. He reported he was not able to tolerate Sotalol and it was stopped. No reoccurrence of palpitations reported, therefore metoprolol not restarted.     He returns today for follow up and reports doing well. He denies chest pain, shortness of breath, palpitations, fatigue, dizziness, and syncope. He remains active at home and feels like he can do his ADLs without concerns. Labs he admits remain followed by PCP and were most recently done 3 months ago, none available for review. No refills needed. He does admit sugar was slightly elevated on most recent labs but can not recall exact A1C.           Cardiac History  Past Surgical History:   Procedure Laterality Date   • CARDIAC CATHETERIZATION  01/09/2004    EF 65%, 4 +MR, patent stent LCX   • CARDIAC CATHETERIZATION  01/19/2004    80% LCX- 2.5x13 & 2.5x8 Pixel Stents   • CARDIOVASCULAR STRESS TEST  12/12/2011    EF 65%, 6 min 7 sec, 78% THR, 160/62, no ischemia   • CARDIOVASCULAR STRESS TEST  12/06/2017    8 Min,10 Secs. 82% THR. BP-  188/61. Negative   • CONVERTED (HISTORICAL) HOLTER  12/18/2017    Holter- 24 hr- avg 66, freq PVC, 4.5%   • CORONARY ARTERY BYPASS GRAFT  03/08/2004    s/p mitral valve repair   • ECHO - CONVERTED  12/12/2011    EF 60-65%, MVA 1.7, RSVP 39   • ECHO - CONVERTED  11/08/2016    EF 60-65%, mitral annular ring in place, Mild to mod MS, mild MR   • ECHO - CONVERTED  12/06/2017    EF 65%. Mild MS & MR. RVSP- 38 mmHg       Current Outpatient Medications   Medication Sig Dispense Refill   • aspirin 81 MG EC tablet Take 81 mg by mouth Daily.     • atorvastatin (LIPITOR) 20 MG tablet Take 20 mg by mouth Daily.     • diazePAM (VALIUM) 5 MG tablet Take 5 mg by mouth At Night As Needed for anxiety.     • isosorbide mononitrate (IMDUR) 30 MG 24 hr tablet Take 30 mg by mouth 2 (Two) Times a Day.     • lisinopril (PRINIVIL,ZESTRIL) 10 MG tablet Take 10 mg by mouth As Needed.     • metFORMIN (GLUCOPHAGE) 1000 MG tablet Take 1,000 mg by mouth 2 (Two) Times a Day With Meals.     • Multiple Vitamins-Minerals (MENS MULTI VITAMIN & MINERAL) tablet Take 1 tablet by mouth Daily.     • nitroglycerin (NITROSTAT) 0.4 MG SL tablet Place 1 tablet under the tongue Every 5 (Five) Minutes As Needed for Chest Pain. Take no more than 3 doses in 15 minutes. 25 tablet 1   • omeprazole (priLOSEC) 20 MG capsule Take 20 mg by mouth Daily.     • piroxicam (FELDENE) 20 MG capsule Take 20 mg by mouth Daily.     • potassium chloride (K-DUR) 10 MEQ CR tablet Take 10 mEq by mouth Daily.       No current facility-administered medications for this visit.        Phenergan [promethazine hcl]    Past Medical History:   Diagnosis Date   • CAD (coronary artery disease)    • Diabetes Mellitus    • Hx of Intravertebral disc disease    • Hypertension    • MR (congenital mitral regurgitation)    • Overnight pulse oximetry 02/20/2009    Increase O2, sleep study.   • Robotic Prostatectomy    • Sleep study- [place] 03/04/2009       Social History     Socioeconomic History   •  "Marital status:      Spouse name: Not on file   • Number of children: Not on file   • Years of education: Not on file   • Highest education level: Not on file   Tobacco Use   • Smoking status: Never Smoker   • Smokeless tobacco: Never Used   Substance and Sexual Activity   • Alcohol use: Yes     Comment: rarely   • Drug use: No       Family History   Problem Relation Age of Onset   • Heart attack Mother    • Heart disease Mother    • Diabetes Mother    • Cancer Father    • Heart attack Brother    • Heart disease Brother        Review of Systems   Constitution: Negative for malaise/fatigue and night sweats.   Cardiovascular: Negative for chest pain, claudication, dyspnea on exertion, irregular heartbeat, leg swelling, near-syncope, orthopnea, palpitations and syncope.   Respiratory: Negative for cough, shortness of breath and wheezing.    Musculoskeletal: Positive for stiffness. Negative for back pain, joint pain and joint swelling.   Gastrointestinal: Negative for anorexia, heartburn, melena, nausea and vomiting.   Genitourinary: Negative for dysuria, hematuria, hesitancy and nocturia.   Neurological: Negative for dizziness, light-headedness and loss of balance.   Psychiatric/Behavioral: Negative for depression and memory loss. The patient is not nervous/anxious.            Objective     /72 (BP Location: Left arm)   Pulse 64   Ht 182.9 cm (72.01\")   Wt 78.5 kg (173 lb)   BMI 23.46 kg/m²     Physical Exam   Constitutional: He is oriented to person, place, and time. He appears well-developed and well-nourished.   HENT:   Head: Normocephalic and atraumatic.   Eyes: Pupils are equal, round, and reactive to light. EOM are normal.   Neck: Normal range of motion. Neck supple.   Cardiovascular: Normal rate and regular rhythm.   Murmur heard.  Pulmonary/Chest: Effort normal and breath sounds normal.   Abdominal: Soft.   Musculoskeletal: Normal range of motion.   Neurological: He is alert and oriented to " person, place, and time.   Skin: Skin is warm and dry.   Psychiatric: He has a normal mood and affect. His behavior is normal.       Procedures    Assessment/Plan     HTN:  Well managed on current. No adjustment to current lisinopril therapy will be advised.     Palpitations:  Not reported. No additional BB or CCB added.    IHD:  Cardiac status appears stable. No concerns are voiced. ASA therapy continued at current as bleeding and bruising are denied. No repeat workup recommended. At next visit, repeat workup will be considered due to risk of silent ischemic burden, and already present IHD with stents.    Chronic angina:  Chest pain denied on current imdur therapy.    Hyperlipidemia: FLP monitored by your office. NO recent available but he thinks recent showed lipids are stable. No adjustment to current lipitor therapy recommended as tolerance reported.     Diabetes:  Managed with glucophage therapy.  Patient thinks most recent AIC was elevated. He will follow with you in regards and was urged to limit sugar consumption.    BMI noted at 23.46 and stable.  Good cardiac ADA diet with activity as tolerated advised. \    No refills currently needed.    6 month follow up recommended or sooner if needed.         Problems Addressed this Visit        Cardiovascular and Mediastinum    Hyperlipemia    HTN (hypertension)    Relevant Medications    lisinopril (PRINIVIL,ZESTRIL) 10 MG tablet    CAD (coronary artery disease) - Primary    Cardiac murmur       Endocrine    Diabetes (CMS/HCC)       Other    S/P mitral valve repair      Other Visit Diagnoses     Palpitations              Patient's Body mass index is 23.46 kg/m². BMI is within normal parameters. No follow-up required..                Electronically signed by DEONTE Hilton December 17, 2019 5:11 PM

## 2020-06-16 ENCOUNTER — TELEPHONE (OUTPATIENT)
Dept: CARDIOLOGY | Facility: CLINIC | Age: 75
End: 2020-06-16

## 2020-06-16 NOTE — TELEPHONE ENCOUNTER
Received fax from Dr. Stoner for cardiac clearance for patient to have teeth extractions under local anesthesia. Patient is on aspirin and they are requesting to hold for 5 days. According to our records, last stenting was done on 01/19/04 and had a mitral valve repair on 03/08/04.      Fax [phone]

## 2020-06-23 ENCOUNTER — OFFICE VISIT (OUTPATIENT)
Dept: CARDIOLOGY | Facility: CLINIC | Age: 75
End: 2020-06-23

## 2020-06-23 VITALS
HEART RATE: 72 BPM | WEIGHT: 164 LBS | BODY MASS INDEX: 22.21 KG/M2 | DIASTOLIC BLOOD PRESSURE: 70 MMHG | HEIGHT: 72 IN | SYSTOLIC BLOOD PRESSURE: 126 MMHG | TEMPERATURE: 97.1 F

## 2020-06-23 DIAGNOSIS — I49.3 PVC (PREMATURE VENTRICULAR CONTRACTION): ICD-10-CM

## 2020-06-23 DIAGNOSIS — I25.10 CORONARY ARTERY DISEASE INVOLVING NATIVE CORONARY ARTERY OF NATIVE HEART WITHOUT ANGINA PECTORIS: Primary | ICD-10-CM

## 2020-06-23 DIAGNOSIS — I10 ESSENTIAL HYPERTENSION: ICD-10-CM

## 2020-06-23 DIAGNOSIS — Z98.890 S/P MITRAL VALVE REPAIR: ICD-10-CM

## 2020-06-23 PROCEDURE — 99214 OFFICE O/P EST MOD 30 MIN: CPT | Performed by: NURSE PRACTITIONER

## 2020-06-23 RX ORDER — HYDROCHLOROTHIAZIDE 12.5 MG/1
12.5 TABLET ORAL DAILY PRN
COMMUNITY

## 2020-06-23 NOTE — PROGRESS NOTES
Chief Complaint   Patient presents with   • Follow-up     Cardiac management.   • Lab     Last labs about 6 months ago per PCP. PCP writes refills on medication.   • Fatigue     Having more weakness, he relates to having infection from teeth, to have extractions soon. Had some weight loss, had not been eating well due to needing extractions.     Fernanda Balbuena is a 75 y.o. male with HTN, DM, hyperlipidemia, mitral valve disease, and IHD diagnosed in 2003 when he had stenting followed by mitral valve repair in 2004. In 2011, stress test was negative for ischemia. In November 2016, echocardiogram showed stable mitral valve repair. At his December 2017 visit, he reported difficulty with blood pressure, SOB and edema. Holter showed sinus with PVCs noted. Sotalol added. A repeat stress and echocardiogram were done on 12/6/17. No ischemia was noted and effort tolerance normal. LVEF was 60-65% and mitral annular ring stable. RVSP 38 mmHg. Recommendation to continue medical management. He reported he was not able to tolerate Sotalol and it was stopped. No reoccurrence of palpitations reported, therefore metoprolol not restarted.      He came today for routine follow-up visit.  He denies having any chest pain.  He does admit to weakness and fatigue recently.  He has had difficulty with his teeth and planning to have them extracted soon with Dr. Judy uriarte.  He has not been able to eat properly and has lost about 9 pounds.  He does have mild dyspnea on exertion but not any worse than usual.  He is able to mow his yard without difficulty.  Cardiac clearance has been given for dental extractions.  Refills and labs managed by Dr. Morales.    HPI         Cardiac History:    Past Surgical History:   Procedure Laterality Date   • CARDIAC CATHETERIZATION  01/09/2004    EF 65%, 4 +MR, patent stent LCX   • CARDIAC CATHETERIZATION  01/19/2004    80% LCX- 2.5x13 & 2.5x8 Pixel Stents   • CARDIOVASCULAR STRESS TEST   12/12/2011    EF 65%, 6 min 7 sec, 78% THR, 160/62, no ischemia   • CARDIOVASCULAR STRESS TEST  12/06/2017    8 Min,10 Secs. 82% THR. BP- 188/61. Negative   • CONVERTED (HISTORICAL) HOLTER  12/18/2017    Holter- 24 hr- avg 66, freq PVC, 4.5%   • CORONARY ARTERY BYPASS GRAFT  03/08/2004    s/p mitral valve repair   • ECHO - CONVERTED  12/12/2011    EF 60-65%, MVA 1.7, RSVP 39   • ECHO - CONVERTED  11/08/2016    EF 60-65%, mitral annular ring in place, Mild to mod MS, mild MR   • ECHO - CONVERTED  12/06/2017    EF 65%. Mild MS & MR. RVSP- 38 mmHg       Current Outpatient Medications   Medication Sig Dispense Refill   • aspirin 81 MG EC tablet Take 81 mg by mouth Daily.     • atorvastatin (LIPITOR) 20 MG tablet Take 20 mg by mouth Daily.     • diazePAM (VALIUM) 5 MG tablet Take 5 mg by mouth At Night As Needed for anxiety.     • hydroCHLOROthiazide (HYDRODIURIL) 12.5 MG tablet Take 12.5 mg by mouth Daily.     • isosorbide mononitrate (IMDUR) 30 MG 24 hr tablet Take 30 mg by mouth 2 (Two) Times a Day.     • lisinopril (PRINIVIL,ZESTRIL) 10 MG tablet Take 10 mg by mouth As Needed.     • metFORMIN (GLUCOPHAGE) 1000 MG tablet Take 1,000 mg by mouth 2 (Two) Times a Day With Meals.     • Multiple Vitamins-Minerals (MENS MULTI VITAMIN & MINERAL) tablet Take 1 tablet by mouth Daily.     • nitroglycerin (NITROSTAT) 0.4 MG SL tablet Place 1 tablet under the tongue Every 5 (Five) Minutes As Needed for Chest Pain. Take no more than 3 doses in 15 minutes. 25 tablet 1   • omeprazole (priLOSEC) 20 MG capsule Take 20 mg by mouth Daily.     • piroxicam (FELDENE) 20 MG capsule Take 20 mg by mouth Daily.     • potassium chloride (K-DUR) 10 MEQ CR tablet Take 10 mEq by mouth Daily.       No current facility-administered medications for this visit.        Phenergan [promethazine hcl]    Past Medical History:   Diagnosis Date   • CAD (coronary artery disease)    • Diabetes Mellitus    • Hx of Intravertebral disc disease    • Hypertension     "  • MR (congenital mitral regurgitation)    • Overnight pulse oximetry 02/20/2009    Increase O2, sleep study.   • Robotic Prostatectomy    • Sleep study- [facility] 03/04/2009       Social History     Socioeconomic History   • Marital status:      Spouse name: Not on file   • Number of children: Not on file   • Years of education: Not on file   • Highest education level: Not on file   Tobacco Use   • Smoking status: Never Smoker   • Smokeless tobacco: Never Used   Substance and Sexual Activity   • Alcohol use: Yes     Comment: rarely   • Drug use: No       Family History   Problem Relation Age of Onset   • Heart attack Mother    • Heart disease Mother    • Diabetes Mother    • Cancer Father    • Heart attack Brother    • Heart disease Brother      Review of Systems   Constitution: Positive for weight loss (down 9 lb in six months ). Negative for decreased appetite and malaise/fatigue.   HENT: Negative.    Eyes: Negative.    Cardiovascular: Positive for palpitations (Occasionally). Negative for chest pain, dyspnea on exertion, leg swelling and syncope.   Respiratory: Negative for cough and shortness of breath.    Endocrine: Negative.    Hematologic/Lymphatic: Negative.    Skin: Negative.    Musculoskeletal: Positive for arthritis.   Gastrointestinal: Negative for abdominal pain and melena.   Genitourinary: Negative for hematuria.   Neurological: Negative for dizziness.   Psychiatric/Behavioral: Negative for altered mental status and depression.   Allergic/Immunologic: Negative.       Diabetes- No  Thyroid-normal    Objective     /70 (BP Location: Left arm)   Pulse 72   Temp 97.1 °F (36.2 °C)   Ht 182.9 cm (72.01\")   Wt 74.4 kg (164 lb)   BMI 22.24 kg/m²     Physical Exam   Constitutional: He is oriented to person, place, and time. He appears well-developed and well-nourished. No distress.   HENT:   Head: Normocephalic.   Eyes: Pupils are equal, round, and reactive to light.   Neck: Normal range of motion. "   Cardiovascular: Normal rate, regular rhythm and intact distal pulses.  Occasional extrasystoles are present.   Murmur heard.   Systolic murmur is present with a grade of 3/6 at the apex.  Pulmonary/Chest: Effort normal and breath sounds normal.   Abdominal: Soft. Bowel sounds are normal.   Musculoskeletal: Normal range of motion. He exhibits no edema.   Neurological: He is alert and oriented to person, place, and time.   Skin: Skin is warm and dry. He is not diaphoretic.   Psychiatric: He has a normal mood and affect.   Nursing note and vitals reviewed.     Procedures          Assessment/Plan      Burak was seen today for follow-up, lab and fatigue.    Diagnoses and all orders for this visit:    Coronary artery disease involving native coronary artery of native heart without angina pectoris  -     Adult Transthoracic Echo Complete W/ Cont if Necessary Per Protocol; Future    S/P mitral valve repair  -     Adult Transthoracic Echo Complete W/ Cont if Necessary Per Protocol; Future    PVC (premature ventricular contraction)  -     Adult Transthoracic Echo Complete W/ Cont if Necessary Per Protocol; Future    Essential hypertension  -     Adult Transthoracic Echo Complete W/ Cont if Necessary Per Protocol; Future    Heart rate and blood pressure stable.  Occasional ectopic beat noted on clinical exam, possibly PVC.  He is not bothered by this so we will continue to manage him conservatively.  We reviewed the findings of his last echocardiogram from 2017 showing mild mitral stenosis with mitral regurgitation, well-seated mitral valve ring.  Recommend repeating echocardiogram to reassess MVA, LVEF for routine surveillance.  Lipids have been managed with Lipitor 20 mg which he tolerates well.  Labs have been followed by Dr. Morales.  Patient reports good glucose control.  He appears stable at this time.  Further recommendations to follow echocardiogram.  As long as he does not have any anginal symptoms, will continue  to monitor.  He understands repeat stress test will be indicated if he develops any shortness of breath or chest discomfort.  Follow-up in 6 months.    Patient's Body mass index is 22.24 kg/m². BMI is within normal parameters. No follow-up required..               Electronically signed by DEONTE Ramsey,  June 23, 2020 16:39

## 2020-06-26 ENCOUNTER — HOSPITAL ENCOUNTER (OUTPATIENT)
Dept: CARDIOLOGY | Facility: HOSPITAL | Age: 75
Discharge: HOME | End: 2020-06-26
Admitting: NURSE PRACTITIONER

## 2020-06-26 VITALS — HEIGHT: 72 IN | WEIGHT: 164.02 LBS | BODY MASS INDEX: 22.22 KG/M2

## 2020-06-26 DIAGNOSIS — I25.10 CORONARY ARTERY DISEASE INVOLVING NATIVE CORONARY ARTERY OF NATIVE HEART WITHOUT ANGINA PECTORIS: ICD-10-CM

## 2020-06-26 DIAGNOSIS — I10 ESSENTIAL HYPERTENSION: ICD-10-CM

## 2020-06-26 DIAGNOSIS — I49.3 PVC (PREMATURE VENTRICULAR CONTRACTION): ICD-10-CM

## 2020-06-26 DIAGNOSIS — Z98.890 S/P MITRAL VALVE REPAIR: ICD-10-CM

## 2020-06-26 PROCEDURE — 93306 TTE W/DOPPLER COMPLETE: CPT | Performed by: INTERNAL MEDICINE

## 2020-06-26 PROCEDURE — 93306 TTE W/DOPPLER COMPLETE: CPT

## 2020-06-27 LAB
AORTIC DIMENSIONLESS INDEX: 1 (DI)
BH CV ECHO MEAS - ACS: 1.9 CM
BH CV ECHO MEAS - AO MAX PG (FULL): -0.07 MMHG
BH CV ECHO MEAS - AO MAX PG: 3.9 MMHG
BH CV ECHO MEAS - AO MEAN PG (FULL): 0 MMHG
BH CV ECHO MEAS - AO MEAN PG: 2 MMHG
BH CV ECHO MEAS - AO ROOT AREA (BSA CORRECTED): 2
BH CV ECHO MEAS - AO ROOT AREA: 11.9 CM^2
BH CV ECHO MEAS - AO ROOT DIAM: 3.9 CM
BH CV ECHO MEAS - AO V2 MAX: 99 CM/SEC
BH CV ECHO MEAS - AO V2 MEAN: 65.2 CM/SEC
BH CV ECHO MEAS - AO V2 VTI: 25.1 CM
BH CV ECHO MEAS - BSA(HAYCOCK): 1.9 M^2
BH CV ECHO MEAS - BSA: 2 M^2
BH CV ECHO MEAS - BZI_BMI: 22.2 KILOGRAMS/M^2
BH CV ECHO MEAS - BZI_METRIC_HEIGHT: 182.9 CM
BH CV ECHO MEAS - BZI_METRIC_WEIGHT: 74.4 KG
BH CV ECHO MEAS - EDV(CUBED): 85.2 ML
BH CV ECHO MEAS - EDV(TEICH): 87.7 ML
BH CV ECHO MEAS - EF(CUBED): 75.6 %
BH CV ECHO MEAS - EF(TEICH): 67.8 %
BH CV ECHO MEAS - ESV(CUBED): 20.8 ML
BH CV ECHO MEAS - ESV(TEICH): 28.3 ML
BH CV ECHO MEAS - FS: 37.5 %
BH CV ECHO MEAS - IVS/LVPW: 0.96
BH CV ECHO MEAS - IVSD: 0.87 CM
BH CV ECHO MEAS - LA DIMENSION: 3.8 CM
BH CV ECHO MEAS - LA/AO: 0.97
BH CV ECHO MEAS - LAT PEAK E' VEL: 9.3 CM/SEC
BH CV ECHO MEAS - LV IVRT: 0.12 SEC
BH CV ECHO MEAS - LV MASS(C)D: 126.7 GRAMS
BH CV ECHO MEAS - LV MASS(C)DI: 64.7 GRAMS/M^2
BH CV ECHO MEAS - LV MAX PG: 4 MMHG
BH CV ECHO MEAS - LV MEAN PG: 2 MMHG
BH CV ECHO MEAS - LV V1 MAX: 99.9 CM/SEC
BH CV ECHO MEAS - LV V1 MEAN: 62.5 CM/SEC
BH CV ECHO MEAS - LV V1 VTI: 25.4 CM
BH CV ECHO MEAS - LVIDD: 4.4 CM
BH CV ECHO MEAS - LVIDS: 2.8 CM
BH CV ECHO MEAS - LVPWD: 0.91 CM
BH CV ECHO MEAS - MED PEAK E' VEL: 12.3 CM/SEC
BH CV ECHO MEAS - MV A MAX VEL: 115 CM/SEC
BH CV ECHO MEAS - MV DEC SLOPE: 382 CM/SEC^2
BH CV ECHO MEAS - MV DEC TIME: 0.29 SEC
BH CV ECHO MEAS - MV E MAX VEL: 117 CM/SEC
BH CV ECHO MEAS - MV E/A: 1
BH CV ECHO MEAS - MV MAX PG: 6.1 MMHG
BH CV ECHO MEAS - MV MEAN PG: 3 MMHG
BH CV ECHO MEAS - MV P1/2T MAX VEL: 124 CM/SEC
BH CV ECHO MEAS - MV P1/2T: 95.1 MSEC
BH CV ECHO MEAS - MV V2 MAX: 123 CM/SEC
BH CV ECHO MEAS - MV V2 MEAN: 71.1 CM/SEC
BH CV ECHO MEAS - MV V2 VTI: 39.6 CM
BH CV ECHO MEAS - MVA P1/2T LCG: 1.8 CM^2
BH CV ECHO MEAS - MVA(P1/2T): 2.3 CM^2
BH CV ECHO MEAS - PA MAX PG (FULL): -1.1 MMHG
BH CV ECHO MEAS - PA MAX PG: 2.5 MMHG
BH CV ECHO MEAS - PA MEAN PG (FULL): -1 MMHG
BH CV ECHO MEAS - PA MEAN PG: 1 MMHG
BH CV ECHO MEAS - PA V2 MAX: 78.9 CM/SEC
BH CV ECHO MEAS - PA V2 MEAN: 50 CM/SEC
BH CV ECHO MEAS - PA V2 VTI: 14.4 CM
BH CV ECHO MEAS - RAP SYSTOLE: 10 MMHG
BH CV ECHO MEAS - RV MAX PG: 3.6 MMHG
BH CV ECHO MEAS - RV MEAN PG: 2 MMHG
BH CV ECHO MEAS - RV V1 MAX: 94.6 CM/SEC
BH CV ECHO MEAS - RV V1 MEAN: 57.9 CM/SEC
BH CV ECHO MEAS - RV V1 VTI: 22.1 CM
BH CV ECHO MEAS - RVDD: 3.3 CM
BH CV ECHO MEAS - RVSP: 31 MMHG
BH CV ECHO MEAS - SI(AO): 153.1 ML/M^2
BH CV ECHO MEAS - SI(CUBED): 32.9 ML/M^2
BH CV ECHO MEAS - SI(TEICH): 30.3 ML/M^2
BH CV ECHO MEAS - SV(AO): 299.8 ML
BH CV ECHO MEAS - SV(CUBED): 64.4 ML
BH CV ECHO MEAS - SV(TEICH): 59.4 ML
BH CV ECHO MEAS - TR MAX VEL: 225 CM/SEC
BH CV ECHO MEASUREMENTS AVERAGE E/E' RATIO: 10.83
MAXIMAL PREDICTED HEART RATE: 143 BPM
STRESS TARGET HR: 122 BPM

## 2020-12-22 ENCOUNTER — OFFICE VISIT (OUTPATIENT)
Dept: CARDIOLOGY | Facility: CLINIC | Age: 75
End: 2020-12-22

## 2020-12-22 VITALS
HEIGHT: 72 IN | SYSTOLIC BLOOD PRESSURE: 120 MMHG | TEMPERATURE: 98.4 F | DIASTOLIC BLOOD PRESSURE: 68 MMHG | WEIGHT: 165 LBS | HEART RATE: 68 BPM | BODY MASS INDEX: 22.35 KG/M2

## 2020-12-22 DIAGNOSIS — E78.2 MIXED HYPERLIPIDEMIA: ICD-10-CM

## 2020-12-22 DIAGNOSIS — I10 ESSENTIAL HYPERTENSION: ICD-10-CM

## 2020-12-22 DIAGNOSIS — I25.10 CORONARY ARTERY DISEASE INVOLVING NATIVE CORONARY ARTERY OF NATIVE HEART WITHOUT ANGINA PECTORIS: Primary | ICD-10-CM

## 2020-12-22 DIAGNOSIS — R01.1 CARDIAC MURMUR: ICD-10-CM

## 2020-12-22 DIAGNOSIS — Z98.890 S/P MITRAL VALVE REPAIR: ICD-10-CM

## 2020-12-22 PROCEDURE — 99213 OFFICE O/P EST LOW 20 MIN: CPT | Performed by: NURSE PRACTITIONER

## 2020-12-22 NOTE — PROGRESS NOTES
Chief Complaint   Patient presents with   • Follow-up     Cardiac management . Has no cardiac complaints today.    • Labs     Had labs per PCP recently, will call for results    • Med Refill     No refills needed today. Had med list with him today.       Fernanda Balbuena is a 75 y.o. male  with HTN, DM, hyperlipidemia, mitral valve disease, and IHD diagnosed in 2003 when he had stenting followed by mitral valve repair in 2004. In 2011, stress test was negative for ischemia. In November 2016, echocardiogram showed stable mitral valve repair. At his December 2017 visit, he reported difficulty with blood pressure, SOB and edema. Holter showed sinus with PVCs noted. Sotalol added. A repeat stress and echocardiogram were done on 12/6/17. No ischemia was noted and effort tolerance normal. LVEF was 60-65% and mitral annular ring stable. RVSP 38 mmHg. Recommendation to continue medical management. He reported he was not able to tolerate Sotalol and it was stopped. No reoccurrence of palpitations reported, therefore metoprolol not restarted.     Echocardiogram done 6/27/2020 showed LVEF of 61 to 65%, presence of mitral valve ring with mild mitral regurg.  RVSP was 31 mmHg.  Mild dilation of aortic root noted being 3.9 cm.    Today he comes to the office for a follow-up visit.  He denies chest pain or palpitations.  He admits to maintaining his normal daily activities without increased shortness of breath.  No recent swelling noted.  Overall he feels he is doing very well.    Cardiac History:    Past Surgical History:   Procedure Laterality Date   • CARDIAC CATHETERIZATION  01/09/2004    EF 65%, 4 +MR, patent stent LCX   • CARDIAC CATHETERIZATION  01/19/2004    80% LCX- 2.5x13 & 2.5x8 Pixel Stents   • CARDIOVASCULAR STRESS TEST  12/12/2011    EF 65%, 6 min 7 sec, 78% THR, 160/62, no ischemia   • CARDIOVASCULAR STRESS TEST  12/06/2017    8 Min,10 Secs. 82% THR. BP- 188/61. Negative   • CONVERTED (HISTORICAL)  HOLTER  12/18/2017    Holter- 24 hr- avg 66, freq PVC, 4.5%   • CORONARY ARTERY BYPASS GRAFT  03/08/2004    s/p mitral valve repair   • ECHO - CONVERTED  12/12/2011    EF 60-65%, MVA 1.7, RSVP 39   • ECHO - CONVERTED  11/08/2016    EF 60-65%, mitral annular ring in place, Mild to mod MS, mild MR   • ECHO - CONVERTED  12/06/2017    EF 65%. Mild MS & MR. RVSP- 38 mmHg   • ECHO - CONVERTED  06/27/2020    EF 65%. LA- 3.8 Cm. AO 3.9 Cm. MV Ring. Mild MR. RVSP- 31 mmHg.       Current Outpatient Medications   Medication Sig Dispense Refill   • aspirin 81 MG EC tablet Take 81 mg by mouth Daily.     • atorvastatin (LIPITOR) 20 MG tablet Take 20 mg by mouth Daily.     • diazePAM (VALIUM) 5 MG tablet Take 5 mg by mouth At Night As Needed for anxiety.     • hydroCHLOROthiazide (HYDRODIURIL) 12.5 MG tablet Take 12.5 mg by mouth Daily.     • isosorbide mononitrate (IMDUR) 30 MG 24 hr tablet Take 30 mg by mouth 2 (Two) Times a Day.     • lisinopril (PRINIVIL,ZESTRIL) 10 MG tablet Take 10 mg by mouth As Needed.     • metFORMIN (GLUCOPHAGE) 1000 MG tablet Take 1,000 mg by mouth 2 (Two) Times a Day With Meals.     • Multiple Vitamins-Minerals (MENS MULTI VITAMIN & MINERAL) tablet Take 1 tablet by mouth Daily.     • nitroglycerin (NITROSTAT) 0.4 MG SL tablet Place 1 tablet under the tongue Every 5 (Five) Minutes As Needed for Chest Pain. Take no more than 3 doses in 15 minutes. 25 tablet 1   • omeprazole (priLOSEC) 20 MG capsule Take 20 mg by mouth Daily.     • piroxicam (FELDENE) 20 MG capsule Take 20 mg by mouth Daily.       No current facility-administered medications for this visit.        Phenergan [promethazine hcl]    Past Medical History:   Diagnosis Date   • CAD (coronary artery disease)    • Diabetes Mellitus    • Hx of Intravertebral disc disease    • Hypertension    • MR (congenital mitral regurgitation)    • Overnight pulse oximetry 02/20/2009    Increase O2, sleep study.   • Robotic Prostatectomy    • Sleep study- [facility]  03/04/2009       Social History     Socioeconomic History   • Marital status:      Spouse name: Not on file   • Number of children: Not on file   • Years of education: Not on file   • Highest education level: Not on file   Tobacco Use   • Smoking status: Never Smoker   • Smokeless tobacco: Never Used   Substance and Sexual Activity   • Alcohol use: Yes     Comment: rarely   • Drug use: No       Family History   Problem Relation Age of Onset   • Heart attack Mother    • Heart disease Mother    • Diabetes Mother    • Cancer Father    • Heart attack Brother    • Heart disease Brother        Review of Systems   Constitution: Negative for decreased appetite, diaphoresis and malaise/fatigue.   HENT: Negative for nosebleeds.    Eyes: Negative for blurred vision.   Cardiovascular: Negative for chest pain, claudication, cyanosis, dyspnea on exertion, irregular heartbeat, leg swelling, near-syncope, orthopnea, palpitations, paroxysmal nocturnal dyspnea and syncope.   Respiratory: Negative for shortness of breath.    Endocrine: Negative for cold intolerance and heat intolerance.   Hematologic/Lymphatic: Does not bruise/bleed easily.   Skin: Negative for rash.   Musculoskeletal: Negative for myalgias.   Gastrointestinal: Negative for heartburn, melena and nausea.   Genitourinary: Negative for dysuria and hematuria.   Neurological: Negative for dizziness and light-headedness.   Psychiatric/Behavioral: Positive for memory loss (forgetful at times). The patient does not have insomnia and is not nervous/anxious.         BP Readings from Last 5 Encounters:   12/22/20 120/68   06/23/20 126/70   12/17/19 118/72   05/29/19 140/72   11/29/18 118/64       Wt Readings from Last 5 Encounters:   12/22/20 74.8 kg (165 lb)   06/26/20 74.4 kg (164 lb 0.4 oz)   06/23/20 74.4 kg (164 lb)   12/17/19 78.5 kg (173 lb)   05/29/19 78.7 kg (173 lb 6.4 oz)         Objective     /68 (BP Location: Left arm)   Pulse 68   Temp 98.4 °F (36.9  "°C)   Ht 182 cm (71.65\")   Wt 74.8 kg (165 lb)   BMI 22.60 kg/m²     Eyes:      Pupils: Pupils are equal, round, and reactive to light.   HENT:      Head: Normocephalic.   Neck:      Musculoskeletal: Normal range of motion.   Pulmonary:      Breath sounds: Normal breath sounds.   Cardiovascular:      Normal rate. Regular rhythm.   Abdominal:      General: Bowel sounds are normal.      Palpations: Abdomen is soft.   Musculoskeletal: Normal range of motion.   Skin:     General: Skin is warm.   Neurological:      Mental Status: Alert and oriented to person, place, and time.          Procedures: none tooday            Assessment/Plan     Diagnoses and all orders for this visit:    1. Coronary artery disease involving native coronary artery of native heart without angina pectoris (Primary)    2. Cardiac murmur    3. S/P mitral valve repair    4. Essential hypertension    5. Mixed hyperlipidemia        CAD-patient presents today without cardiac complaints or concerns.  Continue same cardiac plan of care including aspirin, statin, and BP management.  He reports Nitrostat is up-to-date.    Cardiac murmur/mitral valve repair-report of recent echocardiogram reviewed.  Mitral valve repair is stable and LV function normal.  Continue to monitor.    Hypertension-blood pressure controlled.  Continue same dose antihypertensive medication.  Continue DASH diet.    Mixed hyperlipidemia-he will follow with you for lab orders/management.  Advised to continue Lipitor 20 mg, diet, and exercise.  Please forward copy of next lab report when available.    Patient's Body mass index is 22.6 kg/m². BMI is within normal parameters. No follow-up required.  His weight is same as last visit.  He has had no further weight loss after dental work.  He admits to good appetite and no nausea or vomiting.    From a cardiac standpoint patient appears stable.  A 6-month follow-up visit scheduled.  Please call sooner for cardiac concerns.                 "     Electronically signed by DEONTE Hunter,  December 23, 2020 12:57 EST

## 2021-08-23 ENCOUNTER — OFFICE VISIT (OUTPATIENT)
Dept: CARDIOLOGY | Facility: CLINIC | Age: 76
End: 2021-08-23

## 2021-08-23 VITALS
DIASTOLIC BLOOD PRESSURE: 70 MMHG | SYSTOLIC BLOOD PRESSURE: 140 MMHG | WEIGHT: 160 LBS | HEIGHT: 72 IN | HEART RATE: 64 BPM | BODY MASS INDEX: 21.67 KG/M2

## 2021-08-23 DIAGNOSIS — Z98.890 S/P MITRAL VALVE REPAIR: ICD-10-CM

## 2021-08-23 DIAGNOSIS — I10 ESSENTIAL HYPERTENSION: ICD-10-CM

## 2021-08-23 DIAGNOSIS — I25.10 CORONARY ARTERY DISEASE INVOLVING NATIVE CORONARY ARTERY OF NATIVE HEART WITHOUT ANGINA PECTORIS: Primary | ICD-10-CM

## 2021-08-23 DIAGNOSIS — R01.1 CARDIAC MURMUR: ICD-10-CM

## 2021-08-23 DIAGNOSIS — E78.2 MIXED HYPERLIPIDEMIA: ICD-10-CM

## 2021-08-23 PROCEDURE — 99214 OFFICE O/P EST MOD 30 MIN: CPT | Performed by: NURSE PRACTITIONER

## 2021-08-23 RX ORDER — UBIDECARENONE 100 MG
100 CAPSULE ORAL DAILY
COMMUNITY
End: 2021-08-23

## 2021-08-23 RX ORDER — CHLORAL HYDRATE 500 MG
1000 CAPSULE ORAL
COMMUNITY
End: 2022-08-16

## 2021-08-23 NOTE — PROGRESS NOTES
Chief Complaint   Patient presents with   • Follow-up     Cardiac management .Has no cardiac complaints today.   • labs     Had labs per PCp and will have again September 2021 .   • Med Refill     No refills needed . PCP manages refills       Subjective       Burak Balbuena is a 76 y.o. male with HTN, DM, hyperlipidemia, mitral valve disease, and IHD diagnosed in 2003 when he had stenting followed by mitral valve repair in 2004. In 2011, stress test was negative for ischemia. In November 2016, echocardiogram showed stable mitral valve repair. At his December 2017 visit, he reported difficulty with blood pressure, SOB and edema. Holter showed sinus with PVCs noted. Sotalol added. A repeat stress and echocardiogram were done on 12/6/17. No ischemia was noted and effort tolerance normal. LVEF was 60-65% and mitral annular ring stable. RVSP 38 mmHg. Recommendation to continue medical management. He reported he was not able to tolerate Sotalol and it was stopped. No reoccurrence of palpitations reported, therefore metoprolol not restarted. Echocardiogram done 6/27/2020 showed LVEF of 61 to 65%, presence of mitral valve ring with mild mitral regurg.  RVSP was 31 mmHg.  Mild dilation of aortic root noted being 3.9 cm.    Today he comes to the office for a follow up visit. From a cardiac standpoint he denies symptoms or concerns. No recent chest pain, palpitations, or shortness of breath. His main concern is decreased appetite and weight loss. He reports a work-up done in regards to weight loss have been negative. Blood pressures been stable.      Cardiac History:    Past Surgical History:   Procedure Laterality Date   • CARDIAC CATHETERIZATION  01/09/2004    EF 65%, 4 +MR, patent stent LCX   • CARDIAC CATHETERIZATION  01/19/2004    80% LCX- 2.5x13 & 2.5x8 Pixel Stents   • CARDIOVASCULAR STRESS TEST  12/12/2011    EF 65%, 6 min 7 sec, 78% THR, 160/62, no ischemia   • CARDIOVASCULAR STRESS TEST  12/06/2017    8 Min,10  Secs. 82% THR. BP- 188/61. Negative   • CONVERTED (HISTORICAL) HOLTER  12/18/2017    Holter- 24 hr- avg 66, freq PVC, 4.5%   • CORONARY ARTERY BYPASS GRAFT  03/08/2004    s/p mitral valve repair   • ECHO - CONVERTED  12/12/2011    EF 60-65%, MVA 1.7, RSVP 39   • ECHO - CONVERTED  11/08/2016    EF 60-65%, mitral annular ring in place, Mild to mod MS, mild MR   • ECHO - CONVERTED  12/06/2017    EF 65%. Mild MS & MR. RVSP- 38 mmHg   • ECHO - CONVERTED  06/27/2020    EF 65%. LA- 3.8 Cm. AO 3.9 Cm. MV Ring. Mild MR. RVSP- 31 mmHg.       Current Outpatient Medications   Medication Sig Dispense Refill   • aspirin 81 MG EC tablet Take 81 mg by mouth Daily.     • atorvastatin (LIPITOR) 20 MG tablet Take 20 mg by mouth Daily. Decrease to every other day     • diazePAM (VALIUM) 5 MG tablet Take 5 mg by mouth At Night As Needed for anxiety.     • hydroCHLOROthiazide (HYDRODIURIL) 12.5 MG tablet Take 12.5 mg by mouth Daily As Needed.     • isosorbide mononitrate (IMDUR) 30 MG 24 hr tablet Take 30 mg by mouth 2 (Two) Times a Day.     • lisinopril (PRINIVIL,ZESTRIL) 10 MG tablet Take 10 mg by mouth As Needed.     • metFORMIN (GLUCOPHAGE) 1000 MG tablet Take 1,000 mg by mouth 2 (Two) Times a Day With Meals.     • Multiple Vitamins-Minerals (MENS MULTI VITAMIN & MINERAL) tablet Take 1 tablet by mouth Daily.     • nitroglycerin (NITROSTAT) 0.4 MG SL tablet Place 1 tablet under the tongue Every 5 (Five) Minutes As Needed for Chest Pain. Take no more than 3 doses in 15 minutes. 25 tablet 1   • Omega-3 Fatty Acids (fish oil) 1000 MG capsule capsule Take 1,000 mg by mouth Daily With Breakfast.     • omeprazole (priLOSEC) 20 MG capsule Take 20 mg by mouth Daily.     • piroxicam (FELDENE) 20 MG capsule Take 20 mg by mouth Daily.       No current facility-administered medications for this visit.       Phenergan [promethazine hcl]    Past Medical History:   Diagnosis Date   • CAD (coronary artery disease)    • Diabetes Mellitus    • H/O  bilateral inguinal hernia repair 06/2021   • Hx of Intravertebral disc disease    • Hypertension    • MR (congenital mitral regurgitation)    • Overnight pulse oximetry 02/20/2009    Increase O2, sleep study.   • Robotic Prostatectomy    • Sleep study- [facility] 03/04/2009       Social History     Socioeconomic History   • Marital status:      Spouse name: Not on file   • Number of children: Not on file   • Years of education: Not on file   • Highest education level: Not on file   Tobacco Use   • Smoking status: Never Smoker   • Smokeless tobacco: Never Used   Vaping Use   • Vaping Use: Never used   Substance and Sexual Activity   • Alcohol use: Yes     Comment: rarely   • Drug use: No       Family History   Problem Relation Age of Onset   • Heart attack Mother    • Heart disease Mother    • Diabetes Mother    • Cancer Father    • Heart attack Brother    • Heart disease Brother        Review of Systems   Constitutional: Positive for decreased appetite and weight loss. Negative for diaphoresis and malaise/fatigue.   HENT: Negative for nosebleeds.    Eyes: Negative for blurred vision.   Cardiovascular: Negative for chest pain, claudication, cyanosis, dyspnea on exertion, irregular heartbeat, leg swelling, near-syncope, orthopnea, palpitations, paroxysmal nocturnal dyspnea and syncope.   Respiratory: Negative for shortness of breath.    Endocrine: Negative for cold intolerance and heat intolerance.   Hematologic/Lymphatic: Does not bruise/bleed easily.   Skin: Negative for rash.   Musculoskeletal: Positive for arthritis. Negative for falls, muscle weakness and myalgias.   Gastrointestinal: Negative for heartburn, melena and nausea.   Genitourinary: Negative for dysuria and hematuria.   Neurological: Positive for weakness (mild). Negative for dizziness and light-headedness.   Psychiatric/Behavioral: The patient does not have insomnia and is not nervous/anxious.         BP Readings from Last 5 Encounters:   08/23/21  "140/70   12/22/20 120/68   06/23/20 126/70   12/17/19 118/72   05/29/19 140/72       Wt Readings from Last 5 Encounters:   08/23/21 72.6 kg (160 lb)   12/22/20 74.8 kg (165 lb)   06/26/20 74.4 kg (164 lb 0.4 oz)   06/23/20 74.4 kg (164 lb)   12/17/19 78.5 kg (173 lb)       Objective     /70 (BP Location: Left arm)   Pulse 64   Ht 182.9 cm (72\")   Wt 72.6 kg (160 lb)   BMI 21.70 kg/m²     Vitals and nursing note reviewed.   Eyes:      Pupils: Pupils are equal, round, and reactive to light.   HENT:      Head: Normocephalic.   Neck:      Vascular: No carotid bruit or JVD.   Pulmonary:      Breath sounds: Normal breath sounds.   Cardiovascular:      Normal rate. Regular rhythm. loud S2.      Murmurs: There is a grade 2/6 systolic murmur.   Edema:     Ankle: bilateral trace edema of the ankle.  Abdominal:      General: Bowel sounds are normal.      Palpations: Abdomen is soft.   Musculoskeletal: Normal range of motion.      Cervical back: Normal range of motion. Skin:     General: Skin is warm.   Neurological:      Mental Status: Alert and oriented to person, place, and time.          Procedures: none today          Assessment/Plan   Diagnoses and all orders for this visit:    1. Coronary artery disease involving native coronary artery of native heart without angina pectoris (Primary)    2. Essential hypertension    3. Mixed hyperlipidemia    4. Cardiac murmur    5. S/P mitral valve repair      CAD-patient denies symptoms of angina. His stress test in 2017 was negative for ischemia. Given he remains asymptomatic and issues with Covid he declines repeat testing due to length of time. Continue statin and aspirin therapy. Continue Imdur 30 mg twice a day. Nitrostat is up-to-date. Continue current antihypertensive agents.    Hypertension-blood pressure 140/70. Continue current dose Imdur. Lisinopril is on as needed basis.    Mixed hyperlipidemia-continue Lipitor. He will follow with you for lab " orders/management.    Weight loss-due to recent weight loss advised patient to stop co-Q10 as it has potential side effect of decreased appetite. He will follow with you for further evaluation management of weight loss.    Patient's Body mass index is 21.7 kg/m². indicating that he is within normal range (BMI 18.5-24.9). No BMI management plan needed. I encouraged small frequent meals with increased caloric intake for benefit of maintaining weight and avoiding further weight loss.     A 6-month follow-up visit scheduled. Please call sooner for cardiac concerns.           Electronically signed by DEONTE Hunter,  August 25, 2021 11:38 EDT

## 2022-02-24 ENCOUNTER — OFFICE VISIT (OUTPATIENT)
Dept: CARDIOLOGY | Facility: CLINIC | Age: 77
End: 2022-02-24

## 2022-02-24 VITALS
DIASTOLIC BLOOD PRESSURE: 74 MMHG | TEMPERATURE: 97.9 F | BODY MASS INDEX: 21.94 KG/M2 | WEIGHT: 162 LBS | SYSTOLIC BLOOD PRESSURE: 122 MMHG | HEIGHT: 72 IN | HEART RATE: 78 BPM

## 2022-02-24 DIAGNOSIS — E78.2 MIXED HYPERLIPIDEMIA: ICD-10-CM

## 2022-02-24 DIAGNOSIS — E11.69 TYPE 2 DIABETES MELLITUS WITH OTHER SPECIFIED COMPLICATION, WITHOUT LONG-TERM CURRENT USE OF INSULIN: ICD-10-CM

## 2022-02-24 DIAGNOSIS — I25.10 CORONARY ARTERY DISEASE INVOLVING NATIVE CORONARY ARTERY OF NATIVE HEART WITHOUT ANGINA PECTORIS: Primary | ICD-10-CM

## 2022-02-24 DIAGNOSIS — I20.8 ANGINAL EQUIVALENT: ICD-10-CM

## 2022-02-24 DIAGNOSIS — I25.6 SILENT MYOCARDIAL ISCHEMIA: ICD-10-CM

## 2022-02-24 DIAGNOSIS — I10 PRIMARY HYPERTENSION: ICD-10-CM

## 2022-02-24 DIAGNOSIS — Z98.890 S/P MITRAL VALVE REPAIR: ICD-10-CM

## 2022-02-24 DIAGNOSIS — R01.1 HEART MURMUR: ICD-10-CM

## 2022-02-24 DIAGNOSIS — R06.02 SHORTNESS OF BREATH: ICD-10-CM

## 2022-02-24 PROCEDURE — 99214 OFFICE O/P EST MOD 30 MIN: CPT | Performed by: NURSE PRACTITIONER

## 2022-02-24 RX ORDER — NITROGLYCERIN 0.4 MG/1
0.4 TABLET SUBLINGUAL
Qty: 25 TABLET | Refills: 1 | Status: SHIPPED | OUTPATIENT
Start: 2022-02-24

## 2022-02-24 RX ORDER — CALCIUM CARBONATE 300MG(750)
400 TABLET,CHEWABLE ORAL DAILY
COMMUNITY

## 2022-02-24 RX ORDER — CHOLECALCIFEROL (VITAMIN D3) 125 MCG
2000 CAPSULE ORAL DAILY
COMMUNITY

## 2022-02-24 RX ORDER — LORATADINE 10 MG/1
10 TABLET ORAL DAILY PRN
COMMUNITY

## 2022-02-24 NOTE — PROGRESS NOTES
Chief Complaint   Patient presents with   • Follow-up     Pt is here for cardiac follow up.  He is joined in the room by his wife.  Pt denies SOB,  CP, palpitations or dizziness.  Pt does take a daily ASA.   • Med Refill     Pt's PCP currently fills all meds.   • Lab Work     Pt states his last labs were with Dr Morales in September.       Cardiac Complaints  dyspnea      Subjective   Burak Balbuena is a 77 y.o. male with HTN, DM, hyperlipidemia, mitral valve disease, and IHD diagnosed in 2003 when he had stenting followed by mitral valve repair in 2004. In 2011, stress test was negative for ischemia. In November 2016, echocardiogram showed stable mitral valve repair. At his December 2017 visit, he reported difficulty with blood pressure, SOB and edema. Holter showed sinus with PVCs noted. Sotalol added. A repeat stress and echocardiogram were done on 12/6/17. No ischemia was noted and effort tolerance normal. LVEF was 60-65% and mitral annular ring stable. RVSP 38 mmHg. Recommendation to continue medical management. He reported he was not able to tolerate Sotalol and it was stopped. No reoccurrence of palpitations reported, therefore metoprolol not restarted. Echocardiogram done 6/27/2020 showed LVEF of 61 to 65%, presence of mitral valve ring with mild mitral regurg.  RVSP was 31 mmHg.  Mild dilation of aortic root noted being 3.9 cm.    He returns today for follow up accompanied by his wife. He reports doing well from a cardiac standpoint. No CP, SOA, palpitations, dizziness, or syncope reported with normal exertion. He does however report SOA with climbing and overexertion.  Labs he states are followed by PCP, last checked in September, none available for review. No refills needed.     Cardiac History  Past Surgical History:   Procedure Laterality Date   • CARDIAC CATHETERIZATION  01/09/2004    EF 65%, 4 +MR, patent stent LCX   • CARDIAC CATHETERIZATION  01/19/2004    80% LCX- 2.5x13 & 2.5x8 Pixel Stents   •  CARDIOVASCULAR STRESS TEST  12/12/2011    EF 65%, 6 min 7 sec, 78% THR, 160/62, no ischemia   • CARDIOVASCULAR STRESS TEST  12/06/2017    8 Min,10 Secs. 82% THR. BP- 188/61. Negative   • CONVERTED (HISTORICAL) HOLTER  12/18/2017    Holter- 24 hr- avg 66, freq PVC, 4.5%   • CORONARY ARTERY BYPASS GRAFT  03/08/2004    s/p mitral valve repair   • ECHO - CONVERTED  12/12/2011    EF 60-65%, MVA 1.7, RSVP 39   • ECHO - CONVERTED  11/08/2016    EF 60-65%, mitral annular ring in place, Mild to mod MS, mild MR   • ECHO - CONVERTED  12/06/2017    EF 65%. Mild MS & MR. RVSP- 38 mmHg   • ECHO - CONVERTED  06/27/2020    EF 65%. LA- 3.8 Cm. AO 3.9 Cm. MV Ring. Mild MR. RVSP- 31 mmHg.       Current Outpatient Medications   Medication Sig Dispense Refill   • aspirin 81 MG EC tablet Take 81 mg by mouth Daily.     • Calcium Carb-Cholecalciferol (CALCIUM 1000 + D PO) Take 1,000 mg by mouth Daily.     • Cholecalciferol (Vitamin D3) 50 MCG (2000 UT) tablet Take 2,000 Units by mouth Daily.     • diazePAM (VALIUM) 5 MG tablet Take 5 mg by mouth At Night As Needed for anxiety.     • hydroCHLOROthiazide (HYDRODIURIL) 12.5 MG tablet Take 12.5 mg by mouth Daily As Needed.     • isosorbide mononitrate (IMDUR) 30 MG 24 hr tablet Take 30 mg by mouth 2 (Two) Times a Day.     • lisinopril (PRINIVIL,ZESTRIL) 10 MG tablet Take 10 mg by mouth As Needed.     • loratadine (CLARITIN) 10 MG tablet Take 10 mg by mouth Daily.     • Magnesium 400 MG tablet Take 400 mg by mouth Daily.     • metFORMIN (GLUCOPHAGE) 1000 MG tablet Take 1,000 mg by mouth 2 (Two) Times a Day With Meals.     • nitroglycerin (NITROSTAT) 0.4 MG SL tablet Place 1 tablet under the tongue Every 5 (Five) Minutes As Needed for Chest Pain. Take no more than 3 doses in 15 minutes. 25 tablet 1   • Omega-3 Fatty Acids (fish oil) 1000 MG capsule capsule Take 1,000 mg by mouth Daily With Breakfast.     • omeprazole (priLOSEC) 20 MG capsule Take 20 mg by mouth Daily.       No current  "facility-administered medications for this visit.       Phenergan [promethazine hcl]    Past Medical History:   Diagnosis Date   • CAD (coronary artery disease)    • Diabetes Mellitus    • H/O bilateral inguinal hernia repair 06/2021   • History of colonoscopy 02/01/2022   • Hx of Intravertebral disc disease    • Hypertension    • MR (congenital mitral regurgitation)    • Overnight pulse oximetry 02/20/2009    Increase O2, sleep study.   • Robotic Prostatectomy    • Sleep study- [place] 03/04/2009       Social History     Socioeconomic History   • Marital status:    Tobacco Use   • Smoking status: Never Smoker   • Smokeless tobacco: Never Used   Vaping Use   • Vaping Use: Never used   Substance and Sexual Activity   • Alcohol use: Yes     Comment: rarely   • Drug use: No       Family History   Problem Relation Age of Onset   • Heart attack Mother    • Heart disease Mother    • Diabetes Mother    • Cancer Father    • Heart attack Brother    • Heart disease Brother        Review of Systems   Constitutional: Positive for malaise/fatigue. Negative for night sweats.   Cardiovascular: Positive for dyspnea on exertion. Negative for chest pain, claudication, irregular heartbeat, leg swelling, near-syncope, orthopnea, palpitations and syncope.   Respiratory: Positive for shortness of breath. Negative for cough and wheezing.    Musculoskeletal: Positive for stiffness. Negative for back pain and joint pain.   Gastrointestinal: Negative for anorexia, heartburn, melena, nausea and vomiting.   Genitourinary: Negative for dysuria, hematuria, hesitancy and nocturia.   Neurological: Negative for dizziness, light-headedness and loss of balance.   Psychiatric/Behavioral: Negative for depression and memory loss. The patient is not nervous/anxious.            Objective     /74 (BP Location: Left arm, Patient Position: Sitting)   Pulse 78   Temp 97.9 °F (36.6 °C)   Ht 182.9 cm (72\")   Wt 73.5 kg (162 lb)   BMI 21.97 kg/m² "     Constitutional:       Appearance: Not in distress.   HENT:      Nose: Nose normal.   Pulmonary:      Effort: Pulmonary effort is normal.      Breath sounds: Normal breath sounds.   Cardiovascular:      PMI at left midclavicular line. Normal rate. Regular rhythm.      Murmurs: There is a systolic murmur.   Abdominal:      Palpations: Abdomen is soft.   Musculoskeletal: Normal range of motion.      Cervical back: Normal range of motion and neck supple. Skin:     General: Skin is warm and dry.   Neurological:      Mental Status: Alert.         Procedures    Assessment/Plan     IHD:  Chest pain denied with imdur BID, same continued. Most recent stress from 2017 negative for ischemia, since anginal equivalent present and risk of silent myocardial ischemia present, repeat stress advised to assess. ASA and, BP management, and lipid management continued.  Stress will be done as a lexiscan as he does have issues with mobility and endurance.    HTN:  Blood pressure stable with use of imdur. He will continue with lisinopril and HCTZ therapy as needed. Limited sodium advised.     Hyperlipidemia:  Continue statin with lipitor. FLP with PCP. For now, same continued as tolerance reported. Can we have FLP for our review?    Murmur:  Noted today. Murmur noted at 2/6, MV repair in 2004. Repeat echo recommended to reassess MVA and LV function.    DM:  Taking glucophage therapy.  AIC followed by your office. He was urged on good DM diet with limited sugar/carbs/starches advised.     NTG refills sent as outdated.    BMI noted at 21.91, good cardiac ADA diet with limited carbs, calories, and walking regimen advised.     6 month follow up advised or sooner if needed.          Problems Addressed this Visit        Cardiac and Vasculature    Hyperlipemia    S/P mitral valve repair    Relevant Orders    Adult Transthoracic Echo Complete W/ Cont if Necessary Per Protocol    HTN (hypertension)    CAD (coronary artery disease) - Primary     Relevant Medications    nitroglycerin (NITROSTAT) 0.4 MG SL tablet    Other Relevant Orders    Stress Test With Myocardial Perfusion One Day       Endocrine and Metabolic    Diabetes (HCC)      Other Visit Diagnoses     Heart murmur        Relevant Orders    Adult Transthoracic Echo Complete W/ Cont if Necessary Per Protocol    Silent myocardial ischemia        Relevant Medications    nitroglycerin (NITROSTAT) 0.4 MG SL tablet    Other Relevant Orders    Stress Test With Myocardial Perfusion One Day    Shortness of breath        Relevant Orders    Adult Transthoracic Echo Complete W/ Cont if Necessary Per Protocol    Stress Test With Myocardial Perfusion One Day    Anginal equivalent (HCC)        Relevant Medications    nitroglycerin (NITROSTAT) 0.4 MG SL tablet    Other Relevant Orders    Stress Test With Myocardial Perfusion One Day      Diagnoses       Codes Comments    Coronary artery disease involving native coronary artery of native heart without angina pectoris    -  Primary ICD-10-CM: I25.10  ICD-9-CM: 414.01     S/P mitral valve repair     ICD-10-CM: Z98.890  ICD-9-CM: V45.89     Heart murmur     ICD-10-CM: R01.1  ICD-9-CM: 785.2     Silent myocardial ischemia     ICD-10-CM: I25.6  ICD-9-CM: 414.8     Shortness of breath     ICD-10-CM: R06.02  ICD-9-CM: 786.05     Anginal equivalent (HCC)     ICD-10-CM: I20.8  ICD-9-CM: 413.9     Primary hypertension     ICD-10-CM: I10  ICD-9-CM: 401.9     Mixed hyperlipidemia     ICD-10-CM: E78.2  ICD-9-CM: 272.2     Type 2 diabetes mellitus with other specified complication, without long-term current use of insulin (HCC)     ICD-10-CM: E11.69  ICD-9-CM: 250.80           Patient's Body mass index is 21.97 kg/m². indicating that he is WNW. Good cardiac ADA diet advised with walking regimen encouraged.           Electronically signed by Renay Alexander, DEONTE February 24, 2022 16:35 EST

## 2022-04-05 ENCOUNTER — HOSPITAL ENCOUNTER (OUTPATIENT)
Dept: CARDIOLOGY | Facility: HOSPITAL | Age: 77
Discharge: HOME | End: 2022-04-05

## 2022-04-05 DIAGNOSIS — I20.8 ANGINAL EQUIVALENT: ICD-10-CM

## 2022-04-05 DIAGNOSIS — R06.02 SHORTNESS OF BREATH: ICD-10-CM

## 2022-04-05 DIAGNOSIS — I25.10 CORONARY ARTERY DISEASE INVOLVING NATIVE CORONARY ARTERY OF NATIVE HEART WITHOUT ANGINA PECTORIS: ICD-10-CM

## 2022-04-05 DIAGNOSIS — Z98.890 S/P MITRAL VALVE REPAIR: ICD-10-CM

## 2022-04-05 DIAGNOSIS — I25.6 SILENT MYOCARDIAL ISCHEMIA: ICD-10-CM

## 2022-04-05 DIAGNOSIS — R01.1 HEART MURMUR: ICD-10-CM

## 2022-04-05 PROCEDURE — 93306 TTE W/DOPPLER COMPLETE: CPT

## 2022-04-05 PROCEDURE — 93017 CV STRESS TEST TRACING ONLY: CPT

## 2022-04-05 PROCEDURE — 0 TECHNETIUM SESTAMIBI: Performed by: INTERNAL MEDICINE

## 2022-04-05 PROCEDURE — 25010000002 REGADENOSON 0.4 MG/5ML SOLUTION: Performed by: INTERNAL MEDICINE

## 2022-04-05 PROCEDURE — 93306 TTE W/DOPPLER COMPLETE: CPT | Performed by: INTERNAL MEDICINE

## 2022-04-05 PROCEDURE — A9500 TC99M SESTAMIBI: HCPCS | Performed by: INTERNAL MEDICINE

## 2022-04-05 PROCEDURE — 78452 HT MUSCLE IMAGE SPECT MULT: CPT | Performed by: INTERNAL MEDICINE

## 2022-04-05 PROCEDURE — 93018 CV STRESS TEST I&R ONLY: CPT | Performed by: INTERNAL MEDICINE

## 2022-04-05 PROCEDURE — 78452 HT MUSCLE IMAGE SPECT MULT: CPT

## 2022-04-05 RX ADMIN — REGADENOSON 0.4 MG: 0.08 INJECTION, SOLUTION INTRAVENOUS at 11:16

## 2022-04-05 RX ADMIN — TECHNETIUM TC 99M SESTAMIBI 1 DOSE: 1 INJECTION INTRAVENOUS at 11:17

## 2022-04-05 RX ADMIN — TECHNETIUM TC 99M SESTAMIBI 1 DOSE: 1 INJECTION INTRAVENOUS at 09:48

## 2022-04-09 LAB
BH CV ECHO MEAS - ACS: 1.99 CM
BH CV ECHO MEAS - AO MAX PG: 5.5 MMHG
BH CV ECHO MEAS - AO MEAN PG: 3.4 MMHG
BH CV ECHO MEAS - AO ROOT DIAM: 3.7 CM
BH CV ECHO MEAS - AO V2 MAX: 116.7 CM/SEC
BH CV ECHO MEAS - AO V2 VTI: 27.6 CM
BH CV ECHO MEAS - AVA(I,D): 2.47 CM2
BH CV ECHO MEAS - EDV(CUBED): 44.4 ML
BH CV ECHO MEAS - EDV(MOD-SP4): 83.5 ML
BH CV ECHO MEAS - EF(MOD-SP4): 51.9 %
BH CV ECHO MEAS - EF_3D-VOL: 67 %
BH CV ECHO MEAS - ESV(CUBED): 17 ML
BH CV ECHO MEAS - ESV(MOD-SP4): 40.2 ML
BH CV ECHO MEAS - FS: 27.4 %
BH CV ECHO MEAS - IVS/LVPW: 1.5 CM
BH CV ECHO MEAS - IVSD: 1.39 CM
BH CV ECHO MEAS - LA DIMENSION: 3.6 CM
BH CV ECHO MEAS - LAT PEAK E' VEL: 7.4 CM/SEC
BH CV ECHO MEAS - LV DIASTOLIC VOL/BSA (35-75): 42.9 CM2
BH CV ECHO MEAS - LV MASS(C)D: 130.9 GRAMS
BH CV ECHO MEAS - LV MAX PG: 4.8 MMHG
BH CV ECHO MEAS - LV MEAN PG: 2.9 MMHG
BH CV ECHO MEAS - LV SYSTOLIC VOL/BSA (12-30): 20.6 CM2
BH CV ECHO MEAS - LV V1 MAX: 109.7 CM/SEC
BH CV ECHO MEAS - LV V1 VTI: 27.1 CM
BH CV ECHO MEAS - LVIDD: 3.5 CM
BH CV ECHO MEAS - LVIDS: 2.6 CM
BH CV ECHO MEAS - LVOT AREA: 2.5 CM2
BH CV ECHO MEAS - LVOT DIAM: 1.79 CM
BH CV ECHO MEAS - LVPWD: 0.93 CM
BH CV ECHO MEAS - MED PEAK E' VEL: 6.2 CM/SEC
BH CV ECHO MEAS - MV A MAX VEL: 128.5 CM/SEC
BH CV ECHO MEAS - MV DEC SLOPE: 358.2 CM/SEC2
BH CV ECHO MEAS - MV E MAX VEL: 132 CM/SEC
BH CV ECHO MEAS - MV E/A: 1.03
BH CV ECHO MEAS - MV MAX PG: 7.7 MMHG
BH CV ECHO MEAS - MV MEAN PG: 3 MMHG
BH CV ECHO MEAS - MV V2 VTI: 52.4 CM
BH CV ECHO MEAS - MVA(VTI): 1.3 CM2
BH CV ECHO MEAS - RAP SYSTOLE: 10 MMHG
BH CV ECHO MEAS - RVDD: 3.3 CM
BH CV ECHO MEAS - RVSP: 35 MMHG
BH CV ECHO MEAS - SI(MOD-SP4): 22.2 ML/M2
BH CV ECHO MEAS - SV(LVOT): 68.3 ML
BH CV ECHO MEAS - SV(MOD-SP4): 43.3 ML
BH CV ECHO MEAS - TR MAX PG: 25 MMHG
BH CV ECHO MEAS - TR MAX VEL: 249.8 CM/SEC
BH CV ECHO MEASUREMENTS AVERAGE E/E' RATIO: 19.41
BH CV REST NUCLEAR ISOTOPE DOSE: 10 MCI
BH CV STRESS COMMENTS STAGE 1: NORMAL
BH CV STRESS DOSE REGADENOSON STAGE 1: 0.4
BH CV STRESS DURATION MIN STAGE 1: 0
BH CV STRESS DURATION SEC STAGE 1: 10
BH CV STRESS NUCLEAR ISOTOPE DOSE: 30 MCI
BH CV STRESS PROTOCOL 1: NORMAL
BH CV STRESS RECOVERY BP: NORMAL MMHG
BH CV STRESS RECOVERY HR: 72 BPM
BH CV STRESS STAGE 1: 1
LEFT ATRIUM VOLUME INDEX: 21.2 ML/M2
LV EF NUC BP: 61 %
MAXIMAL PREDICTED HEART RATE: 141 BPM
MAXIMAL PREDICTED HEART RATE: 141 BPM
PERCENT MAX PREDICTED HR: 51.06 %
STRESS BASELINE BP: NORMAL MMHG
STRESS BASELINE HR: 61 BPM
STRESS PERCENT HR: 60 %
STRESS POST PEAK BP: NORMAL MMHG
STRESS POST PEAK HR: 72 BPM
STRESS TARGET HR: 120 BPM
STRESS TARGET HR: 120 BPM

## 2022-04-13 ENCOUNTER — TELEPHONE (OUTPATIENT)
Dept: CARDIOLOGY | Facility: CLINIC | Age: 77
End: 2022-04-13

## 2022-04-13 NOTE — TELEPHONE ENCOUNTER
Patient's wife, Jaimee, was made aware of results of stress and echo. She was made aware of results and recommendations to call if patient develops chest pain or shortness of breath.

## 2022-06-16 ENCOUNTER — TELEPHONE (OUTPATIENT)
Dept: CARDIOLOGY | Facility: CLINIC | Age: 77
End: 2022-06-16

## 2022-06-16 DIAGNOSIS — I20.9 ANGINAL SYNDROME: ICD-10-CM

## 2022-06-16 DIAGNOSIS — R07.89 CHEST DISCOMFORT: Primary | ICD-10-CM

## 2022-06-16 NOTE — TELEPHONE ENCOUNTER
Per Jojo, Dr. Morales's nurse Miguelina called stating Dr. Morales feels like patient needs to have a cardiac catheterization due to chest pressure and patient has been having to take his NTG.    I talked to patient's wife, Jaimee.  Over the last 2 weeks, patient has had to use NTG 4 different episodes of chest pressure.  She has had to give him 2 NTG for relief a few times.  Confirmed patient is taking Imdur 30 mg BID.    132/70's, HR 60's

## 2022-06-20 ENCOUNTER — TELEPHONE (OUTPATIENT)
Dept: CARDIOLOGY | Facility: CLINIC | Age: 77
End: 2022-06-20

## 2022-06-20 NOTE — TELEPHONE ENCOUNTER
I working on scheduling patient's cardiac catheterization.  Dr. Morales had called to schedule due to patient having more chest pain relieved with nitroglycerin.  I talked to patient's wife, Jaimee.  Patient is having to take NTG SL almost everyday.  2 days ago he had to take NTG x 2 for relief.  I asked her if it was with exertion.  She said he has episodes of chest heaviness even at rest.  My next opening for outpatient cardiac cath is July 13th.  He takes Imdur 30 mg BID.  /70's, HR 60's  What do you recommend?

## 2022-06-24 ENCOUNTER — OUTSIDE FACILITY SERVICE (OUTPATIENT)
Dept: CARDIOLOGY | Facility: CLINIC | Age: 77
End: 2022-06-24

## 2022-06-24 PROCEDURE — 93458 L HRT ARTERY/VENTRICLE ANGIO: CPT | Performed by: INTERNAL MEDICINE

## 2022-06-25 ENCOUNTER — OUTSIDE FACILITY SERVICE (OUTPATIENT)
Dept: CARDIOLOGY | Facility: CLINIC | Age: 77
End: 2022-06-25

## 2022-06-25 PROCEDURE — 99217 PR OBSERVATION CARE DISCHARGE MANAGEMENT: CPT | Performed by: INTERNAL MEDICINE

## 2022-08-16 ENCOUNTER — OFFICE VISIT (OUTPATIENT)
Dept: CARDIOLOGY | Facility: CLINIC | Age: 77
End: 2022-08-16

## 2022-08-16 VITALS
BODY MASS INDEX: 21.56 KG/M2 | DIASTOLIC BLOOD PRESSURE: 70 MMHG | HEART RATE: 76 BPM | SYSTOLIC BLOOD PRESSURE: 140 MMHG | WEIGHT: 159.2 LBS | HEIGHT: 72 IN

## 2022-08-16 DIAGNOSIS — I10 ESSENTIAL HYPERTENSION: ICD-10-CM

## 2022-08-16 DIAGNOSIS — E78.2 MIXED HYPERLIPIDEMIA: ICD-10-CM

## 2022-08-16 DIAGNOSIS — E11.69 TYPE 2 DIABETES MELLITUS WITH OTHER SPECIFIED COMPLICATION, WITHOUT LONG-TERM CURRENT USE OF INSULIN: ICD-10-CM

## 2022-08-16 DIAGNOSIS — E55.9 VITAMIN D DEFICIENCY: ICD-10-CM

## 2022-08-16 DIAGNOSIS — I25.10 CORONARY ARTERY DISEASE INVOLVING NATIVE CORONARY ARTERY OF NATIVE HEART WITHOUT ANGINA PECTORIS: ICD-10-CM

## 2022-08-16 DIAGNOSIS — Z98.890 S/P MITRAL VALVE REPAIR: ICD-10-CM

## 2022-08-16 DIAGNOSIS — U09.9 POST COVID-19 CONDITION, UNSPECIFIED: ICD-10-CM

## 2022-08-16 DIAGNOSIS — R06.02 SHORTNESS OF BREATH: Primary | ICD-10-CM

## 2022-08-16 PROCEDURE — 99214 OFFICE O/P EST MOD 30 MIN: CPT | Performed by: NURSE PRACTITIONER

## 2022-08-16 RX ORDER — ROSUVASTATIN CALCIUM 20 MG/1
20 TABLET, COATED ORAL DAILY
Qty: 90 TABLET | Refills: 3 | Status: SHIPPED | OUTPATIENT
Start: 2022-08-16

## 2022-08-16 RX ORDER — UBIDECARENONE 100 MG
100 CAPSULE ORAL DAILY
COMMUNITY

## 2022-08-16 RX ORDER — ROSUVASTATIN CALCIUM 20 MG/1
20 TABLET, COATED ORAL DAILY
COMMUNITY
End: 2022-08-16 | Stop reason: SDUPTHER

## 2022-08-16 NOTE — PROGRESS NOTES
Chief Complaint   Patient presents with   • Hospital follow up     Had cardiac cath 6/24/22 with stenting, see chart.   • Lab     Last labs in March per PCP.   • Shortness of Breath     Doing much better since having stents.   • Med Refill     Needs refills on Brilinta and Crestor-90 day.     Subjective       Burak Balbuena is a 77 y.o. male with HTN, DM, hyperlipidemia, mitral valve disease, and IHD diagnosed in 2003 when he had stenting followed by mitral valve repair in 2004. He tried sotalol for management of PVC in the past but did not tolerate. Echocardiogram 6/27/2020 showed LVEF 65%, presence of mitral valve ring with mild mitral regurg.  AO 3.9 cm. He returned April 2022 with increasing dyspnea with climbing and overexerting. Stress showed inferior ischemia. Cardiac cath revealed 85% RCA and had single stent, OM1 received two Resolute stents and moderate disease of the LAD.      He returns today for hospital follow up with his wife. Shortness of breath significantly improved. Denies chest pain. He was discharged with Brilinta added to aspirin. LDL 97, Lipitor changed to Crestor with plans to recheck lipids. A1C 6.5%.        Cardiac History:    Past Surgical History:   Procedure Laterality Date   • CARDIAC CATHETERIZATION  01/09/2004    EF 65%, 4 +MR, patent stent LCX   • CARDIAC CATHETERIZATION  01/19/2004    80% LCX- 2.5x13 & 2.5x8 Pixel Stents   • CARDIAC CATHETERIZATION  06/24/2022    85% RCA- 3.5x12. 99% OM1- 2.5x30 & 2.5x8 Resolute. 65% Mid LAD   • CARDIOVASCULAR STRESS TEST  12/12/2011    EF 65%, 6 min 7 sec, 78% THR, 160/62, no ischemia   • CARDIOVASCULAR STRESS TEST  12/06/2017    8 Min,10 Secs. 82% THR. BP- 188/61. Negative   • CARDIOVASCULAR STRESS TEST  04/05/2022    Lexiscan- EF 61%. Inferior Ischemia   • CONVERTED (HISTORICAL) HOLTER  12/18/2017    Holter- 24 hr- avg 66, freq PVC, 4.5%   • ECHO - CONVERTED  12/12/2011    EF 60-65%, MVA 1.7, RSVP 39   • ECHO - CONVERTED  11/08/2016    EF  60-65%, mitral annular ring in place, Mild to mod MS, mild MR   • ECHO - CONVERTED  12/06/2017    EF 65%. Mild MS & MR. RVSP- 38 mmHg   • ECHO - CONVERTED  06/27/2020    EF 65%. LA- 3.8 Cm. AO 3.9 Cm. MV Ring. Mild MR. RVSP- 31 mmHg.   • ECHO - CONVERTED  04/04/2022    EF 65%. Inferoseptal WMA. MVR. Trace-Mild MR. RVSP- 35 mmHg   • MITRAL VALVE REPAIR/REPLACEMENT  03/08/2004    s/p mitral valve repair     Current Outpatient Medications   Medication Sig Dispense Refill   • Calcium Carb-Cholecalciferol (CALCIUM 1000 + D PO) Take 1,000 mg by mouth Daily.     • Cholecalciferol (Vitamin D3) 50 MCG (2000 UT) tablet Take 2,000 Units by mouth Daily.     • coenzyme Q10 100 MG capsule Take 100 mg by mouth Daily.     • diazePAM (VALIUM) 5 MG tablet Take 5 mg by mouth At Night As Needed for anxiety.     • hydroCHLOROthiazide (HYDRODIURIL) 12.5 MG tablet Take 12.5 mg by mouth Daily As Needed.     • isosorbide mononitrate (IMDUR) 30 MG 24 hr tablet Take 30 mg by mouth 2 (Two) Times a Day.     • lisinopril (PRINIVIL,ZESTRIL) 10 MG tablet Take 10 mg by mouth As Needed.     • loratadine (CLARITIN) 10 MG tablet Take 10 mg by mouth Daily As Needed.     • Magnesium 400 MG tablet Take 400 mg by mouth Daily.     • metFORMIN (GLUCOPHAGE) 1000 MG tablet Take 1,000 mg by mouth 2 (Two) Times a Day With Meals.     • nitroglycerin (NITROSTAT) 0.4 MG SL tablet Place 1 tablet under the tongue Every 5 (Five) Minutes As Needed for Chest Pain. Take no more than 3 doses in 15 minutes. 25 tablet 1   • Omega-3 Fatty Acids (OMEGA 3 500 PO) Take  by mouth.     • omeprazole (priLOSEC) 20 MG capsule Take 20 mg by mouth Daily.     • rosuvastatin (CRESTOR) 20 MG tablet Take 1 tablet by mouth Daily. 90 tablet 3   • ticagrelor (Brilinta) 90 MG tablet tablet Take 1 tablet by mouth 2 (Two) Times a Day. 180 tablet 3   • aspirin 81 MG EC tablet Take 81 mg by mouth Daily.       No current facility-administered medications for this visit.     Phenergan  "[promethazine hcl]    Past Medical History:   Diagnosis Date   • CAD (coronary artery disease)    • Diabetes Mellitus    • H/O bilateral inguinal hernia repair 06/2021   • History of colonoscopy 02/01/2022   • Hx of Intravertebral disc disease    • Hypertension    • MR (congenital mitral regurgitation)    • Overnight pulse oximetry 02/20/2009    Increase O2, sleep study.   • Robotic Prostatectomy    • Sleep study- [place] 03/04/2009       Social History     Socioeconomic History   • Marital status:    Tobacco Use   • Smoking status: Never Smoker   • Smokeless tobacco: Never Used   Vaping Use   • Vaping Use: Never used   Substance and Sexual Activity   • Alcohol use: Yes     Comment: rarely   • Drug use: No   • Sexual activity: Defer     Family History   Problem Relation Age of Onset   • Heart attack Mother    • Heart disease Mother    • Diabetes Mother    • Cancer Father    • Heart attack Brother    • Heart disease Brother      Review of Systems   Constitutional: Positive for weight loss (-3). Negative for decreased appetite.   HENT: Negative.    Eyes: Negative for blurred vision.   Cardiovascular: Negative for chest pain, dyspnea on exertion, leg swelling, palpitations and syncope.   Respiratory: Negative for shortness of breath and sleep disturbances due to breathing.    Endocrine: Negative.    Hematologic/Lymphatic: Negative for bleeding problem. Does not bruise/bleed easily.   Skin: Negative.    Musculoskeletal: Negative for falls and myalgias.   Gastrointestinal: Negative for abdominal pain, heartburn and melena.   Genitourinary: Negative for hematuria.   Neurological: Negative for dizziness and light-headedness.   Psychiatric/Behavioral: Negative for altered mental status.   Allergic/Immunologic: Negative.         Objective     /70 (BP Location: Left arm)   Pulse 76   Ht 182.9 cm (72.01\")   Wt 72.2 kg (159 lb 3.2 oz)   BMI 21.59 kg/m²     Vitals and nursing note reviewed.   Constitutional:       " General: Not in acute distress.     Appearance: Well-developed. Not diaphoretic.   Eyes:      Pupils: Pupils are equal, round, and reactive to light.   HENT:      Head: Normocephalic.   Pulmonary:      Effort: Pulmonary effort is normal. No respiratory distress.      Breath sounds: Normal breath sounds.   Cardiovascular:      Normal rate. Regular rhythm.      Murmurs: There is a grade 1 to 2/6 systolic murmur.   Pulses:     Intact distal pulses.   Edema:     Peripheral edema absent.   Abdominal:      General: Bowel sounds are normal.      Palpations: Abdomen is soft.   Musculoskeletal: Normal range of motion.      Cervical back: Normal range of motion. Skin:     General: Skin is warm and dry.   Neurological:      Mental Status: Alert and oriented to person, place, and time.        Procedures          Problem List Items Addressed This Visit        Cardiac and Vasculature    Hyperlipemia    Relevant Medications    rosuvastatin (CRESTOR) 20 MG tablet    Other Relevant Orders    Comprehensive Metabolic Panel    CBC & Differential    Lipid Panel    S/P mitral valve repair    Relevant Orders    Comprehensive Metabolic Panel    CBC & Differential    CAD (coronary artery disease)    Relevant Medications    ticagrelor (Brilinta) 90 MG tablet tablet    Other Relevant Orders    Comprehensive Metabolic Panel    CBC & Differential    SARS-CoV-2 Semi-Quant Total Ab       Endocrine and Metabolic    Diabetes (HCC)    Relevant Orders    Comprehensive Metabolic Panel    CBC & Differential    Hemoglobin A1c    SARS-CoV-2 Semi-Quant Total Ab      Other Visit Diagnoses     Shortness of breath    -  Primary    Relevant Orders    Comprehensive Metabolic Panel    CBC & Differential    Lipid Panel    TSH    Vitamin D 1,25 Dihydroxy    SARS-CoV-2 Semi-Quant Total Ab    Essential hypertension        Relevant Orders    Comprehensive Metabolic Panel    CBC & Differential    TSH    SARS-CoV-2 Semi-Quant Total Ab    Vitamin D deficiency         Relevant Orders    Vitamin D 1,25 Dihydroxy    Post covid-19 condition, unspecified        Relevant Orders    SARS-CoV-2 Semi-Quant Total Ab         1. IHD- cardiac cath findings reviewed with him. Stenting to RCA and OM, 65% LAD managed medically. Will aggressively manage lipids. Continue DAPT. Tolerating Brilinta.    2. HTN- stable, continue same plan.     3. Hypercholesterolemia- Lipitor 20 mg changed to Crestor 20 mg. Will recheck lipids and LFT.     4. Mitral repair- clinically stable.     5. Patient requested Covid antibody level to be checked which was ordered.     6. Diabetes- well controlled with A1C 6.5%.     Labs ordered, follow up in 6 months.     BMI is within normal parameters. No other follow-up for BMI required.               Electronically signed by DEONTE Ramsey,  August 20, 2022 15:21 EDT

## 2022-08-30 ENCOUNTER — LAB (OUTPATIENT)
Dept: LAB | Facility: HOSPITAL | Age: 77
End: 2022-08-30

## 2022-08-30 DIAGNOSIS — U09.9 POST COVID-19 CONDITION, UNSPECIFIED: ICD-10-CM

## 2022-08-30 DIAGNOSIS — I10 ESSENTIAL HYPERTENSION: ICD-10-CM

## 2022-08-30 DIAGNOSIS — E11.69 TYPE 2 DIABETES MELLITUS WITH OTHER SPECIFIED COMPLICATION, WITHOUT LONG-TERM CURRENT USE OF INSULIN: ICD-10-CM

## 2022-08-30 DIAGNOSIS — R06.02 SHORTNESS OF BREATH: ICD-10-CM

## 2022-08-30 DIAGNOSIS — E78.2 MIXED HYPERLIPIDEMIA: ICD-10-CM

## 2022-08-30 DIAGNOSIS — I25.10 CORONARY ARTERY DISEASE INVOLVING NATIVE CORONARY ARTERY OF NATIVE HEART WITHOUT ANGINA PECTORIS: ICD-10-CM

## 2022-08-30 DIAGNOSIS — Z98.890 S/P MITRAL VALVE REPAIR: ICD-10-CM

## 2022-08-30 DIAGNOSIS — E55.9 VITAMIN D DEFICIENCY: ICD-10-CM

## 2022-08-30 LAB
ALBUMIN SERPL-MCNC: 4.33 G/DL (ref 3.5–5.2)
ALBUMIN/GLOB SERPL: 1.6 G/DL
ALP SERPL-CCNC: 71 U/L (ref 39–117)
ALT SERPL W P-5'-P-CCNC: 21 U/L (ref 1–41)
ANION GAP SERPL CALCULATED.3IONS-SCNC: 13.5 MMOL/L (ref 5–15)
AST SERPL-CCNC: 29 U/L (ref 1–40)
BASOPHILS # BLD AUTO: 0.06 10*3/MM3 (ref 0–0.2)
BASOPHILS NFR BLD AUTO: 1 % (ref 0–1.5)
BILIRUB SERPL-MCNC: 0.3 MG/DL (ref 0–1.2)
BUN SERPL-MCNC: 16 MG/DL (ref 8–23)
BUN/CREAT SERPL: 16.3 (ref 7–25)
CALCIUM SPEC-SCNC: 10.3 MG/DL (ref 8.6–10.5)
CHLORIDE SERPL-SCNC: 104 MMOL/L (ref 98–107)
CHOLEST SERPL-MCNC: 120 MG/DL (ref 0–200)
CO2 SERPL-SCNC: 26.5 MMOL/L (ref 22–29)
CREAT SERPL-MCNC: 0.98 MG/DL (ref 0.76–1.27)
DEPRECATED RDW RBC AUTO: 47.5 FL (ref 37–54)
EGFRCR SERPLBLD CKD-EPI 2021: 78.4 ML/MIN/1.73
EOSINOPHIL # BLD AUTO: 0.34 10*3/MM3 (ref 0–0.4)
EOSINOPHIL NFR BLD AUTO: 5.8 % (ref 0.3–6.2)
ERYTHROCYTE [DISTWIDTH] IN BLOOD BY AUTOMATED COUNT: 14.1 % (ref 12.3–15.4)
GLOBULIN UR ELPH-MCNC: 2.8 GM/DL
GLUCOSE SERPL-MCNC: 119 MG/DL (ref 65–99)
HBA1C MFR BLD: 6.5 % (ref 4.8–5.6)
HCT VFR BLD AUTO: 36.9 % (ref 37.5–51)
HDLC SERPL-MCNC: 43 MG/DL (ref 40–60)
HGB BLD-MCNC: 11.6 G/DL (ref 13–17.7)
IMM GRANULOCYTES # BLD AUTO: 0.01 10*3/MM3 (ref 0–0.05)
IMM GRANULOCYTES NFR BLD AUTO: 0.2 % (ref 0–0.5)
LDLC SERPL CALC-MCNC: 59 MG/DL (ref 0–100)
LDLC/HDLC SERPL: 1.36 {RATIO}
LYMPHOCYTES # BLD AUTO: 1.57 10*3/MM3 (ref 0.7–3.1)
LYMPHOCYTES NFR BLD AUTO: 26.6 % (ref 19.6–45.3)
MCH RBC QN AUTO: 28.8 PG (ref 26.6–33)
MCHC RBC AUTO-ENTMCNC: 31.4 G/DL (ref 31.5–35.7)
MCV RBC AUTO: 91.6 FL (ref 79–97)
MONOCYTES # BLD AUTO: 0.43 10*3/MM3 (ref 0.1–0.9)
MONOCYTES NFR BLD AUTO: 7.3 % (ref 5–12)
NEUTROPHILS NFR BLD AUTO: 3.5 10*3/MM3 (ref 1.7–7)
NEUTROPHILS NFR BLD AUTO: 59.1 % (ref 42.7–76)
NRBC BLD AUTO-RTO: 0 /100 WBC (ref 0–0.2)
PLATELET # BLD AUTO: 181 10*3/MM3 (ref 140–450)
PMV BLD AUTO: 11.4 FL (ref 6–12)
POTASSIUM SERPL-SCNC: 5.1 MMOL/L (ref 3.5–5.2)
PROT SERPL-MCNC: 7.1 G/DL (ref 6–8.5)
RBC # BLD AUTO: 4.03 10*6/MM3 (ref 4.14–5.8)
SODIUM SERPL-SCNC: 144 MMOL/L (ref 136–145)
TRIGL SERPL-MCNC: 93 MG/DL (ref 0–150)
TSH SERPL DL<=0.05 MIU/L-ACNC: 4.87 UIU/ML (ref 0.27–4.2)
VLDLC SERPL-MCNC: 18 MG/DL (ref 5–40)
WBC NRBC COR # BLD: 5.91 10*3/MM3 (ref 3.4–10.8)

## 2022-08-30 PROCEDURE — 85025 COMPLETE CBC W/AUTO DIFF WBC: CPT | Performed by: NURSE PRACTITIONER

## 2022-08-30 PROCEDURE — 80053 COMPREHEN METABOLIC PANEL: CPT | Performed by: NURSE PRACTITIONER

## 2022-08-30 PROCEDURE — 86769 SARS-COV-2 COVID-19 ANTIBODY: CPT | Performed by: NURSE PRACTITIONER

## 2022-08-30 PROCEDURE — 83036 HEMOGLOBIN GLYCOSYLATED A1C: CPT | Performed by: NURSE PRACTITIONER

## 2022-08-30 PROCEDURE — 82652 VIT D 1 25-DIHYDROXY: CPT | Performed by: NURSE PRACTITIONER

## 2022-08-30 PROCEDURE — 84443 ASSAY THYROID STIM HORMONE: CPT | Performed by: NURSE PRACTITIONER

## 2022-08-30 PROCEDURE — 80061 LIPID PANEL: CPT | Performed by: NURSE PRACTITIONER

## 2022-08-31 LAB
SARS-COV-2 AB SERPL IA-ACNC: 235 U/ML
SARS-COV-2 AB SERPL-IMP: POSITIVE

## 2022-09-02 LAB — 1,25(OH)2D SERPL-MCNC: 27.7 PG/ML (ref 24.8–81.5)

## 2023-05-23 ENCOUNTER — OFFICE VISIT (OUTPATIENT)
Dept: CARDIOLOGY | Facility: CLINIC | Age: 78
End: 2023-05-23
Payer: MEDICARE

## 2023-05-23 VITALS
SYSTOLIC BLOOD PRESSURE: 110 MMHG | DIASTOLIC BLOOD PRESSURE: 72 MMHG | HEIGHT: 72 IN | WEIGHT: 155.6 LBS | HEART RATE: 64 BPM | BODY MASS INDEX: 21.08 KG/M2

## 2023-05-23 DIAGNOSIS — Z98.890 S/P MITRAL VALVE REPAIR: ICD-10-CM

## 2023-05-23 DIAGNOSIS — E78.2 MIXED HYPERLIPIDEMIA: ICD-10-CM

## 2023-05-23 DIAGNOSIS — I25.10 CORONARY ARTERY DISEASE INVOLVING NATIVE CORONARY ARTERY OF NATIVE HEART WITHOUT ANGINA PECTORIS: Primary | ICD-10-CM

## 2023-05-23 DIAGNOSIS — I10 PRIMARY HYPERTENSION: ICD-10-CM

## 2023-05-23 DIAGNOSIS — I49.1 PAC (PREMATURE ATRIAL CONTRACTION): ICD-10-CM

## 2023-05-23 DIAGNOSIS — I49.3 PVC (PREMATURE VENTRICULAR CONTRACTION): ICD-10-CM

## 2023-05-23 DIAGNOSIS — E11.69 TYPE 2 DIABETES MELLITUS WITH OTHER SPECIFIED COMPLICATION, WITHOUT LONG-TERM CURRENT USE OF INSULIN: ICD-10-CM

## 2023-05-23 PROCEDURE — 1159F MED LIST DOCD IN RCRD: CPT | Performed by: NURSE PRACTITIONER

## 2023-05-23 PROCEDURE — 3074F SYST BP LT 130 MM HG: CPT | Performed by: NURSE PRACTITIONER

## 2023-05-23 PROCEDURE — 99214 OFFICE O/P EST MOD 30 MIN: CPT | Performed by: NURSE PRACTITIONER

## 2023-05-23 PROCEDURE — 1160F RVW MEDS BY RX/DR IN RCRD: CPT | Performed by: NURSE PRACTITIONER

## 2023-05-23 PROCEDURE — 3078F DIAST BP <80 MM HG: CPT | Performed by: NURSE PRACTITIONER

## 2023-05-23 RX ORDER — MULTIVIT WITH MINERALS/LUTEIN
1000 TABLET ORAL DAILY
COMMUNITY

## 2023-05-23 RX ORDER — NITROGLYCERIN 0.4 MG/1
0.4 TABLET SUBLINGUAL
Qty: 25 TABLET | Refills: 1 | Status: SHIPPED | OUTPATIENT
Start: 2023-05-23

## 2023-05-23 RX ORDER — ROSUVASTATIN CALCIUM 20 MG/1
20 TABLET, COATED ORAL DAILY
Qty: 90 TABLET | Refills: 3 | Status: SHIPPED | OUTPATIENT
Start: 2023-05-23

## 2023-05-23 RX ORDER — ISOSORBIDE MONONITRATE 30 MG/1
30 TABLET, EXTENDED RELEASE ORAL 2 TIMES DAILY
Qty: 180 TABLET | Refills: 2 | Status: SHIPPED | OUTPATIENT
Start: 2023-05-23

## 2023-05-23 NOTE — PROGRESS NOTES
Chief Complaint   Patient presents with   • Follow-up     Cardiac management   • Lab     Last labs in March per PCP. He also had CT of chest and pelvis in April.   • Med Refill     Needs refills on cardiac medications including Nitro sl-90 day.     Fernanda Balbuena is an 78 y.o. male with HTN, DM, hyperlipidemia, mitral valve disease, and IHD diagnosed in 2003 when he had stenting followed by mitral valve repair in 2004. He tried sotalol for management of PVC in the past but did not tolerate. Echocardiogram 6/27/2020 showed LVEF 65%, presence of mitral valve ring with mild mitral regurg.  AO 3.9 cm. He returned April 2022 with increasing dyspnea with climbing and overexerting. Stress showed inferior ischemia. Cardiac cath revealed 85% RCA and had single stent, OM1 received two Resolute stents and moderate disease of the LAD.       He returns today for follow up with his wife. He is doing well from a cardiac standpoint. No CP. Shortness of breath remains improved. Tolerating Crestor, DAPT. Labs 8/2022 showed LDL improved to 59, HDL 43, Tri 93. A1C remained same at 6.5%. Rechecked in March with Dr. Morales. Refills requested.          Cardiac History:    Past Surgical History:   Procedure Laterality Date   • CARDIAC CATHETERIZATION  01/09/2004    EF 65%, 4 +MR, patent stent LCX   • CARDIAC CATHETERIZATION  01/19/2004    80% LCX- 2.5x13 & 2.5x8 Pixel Stents   • CARDIAC CATHETERIZATION  06/24/2022    85% RCA- 3.5x12. 99% OM1- 2.5x30 & 2.5x8 Resolute. 65% Mid LAD   • CARDIOVASCULAR STRESS TEST  12/12/2011    EF 65%, 6 min 7 sec, 78% THR, 160/62, no ischemia   • CARDIOVASCULAR STRESS TEST  12/06/2017    8 Min,10 Secs. 82% THR. BP- 188/61. Negative   • CARDIOVASCULAR STRESS TEST  04/05/2022    Lexiscan- EF 61%. Inferior Ischemia   • CONVERTED (HISTORICAL) HOLTER  12/18/2017    Holter- 24 hr- avg 66, freq PVC, 4.5%   • ECHO - CONVERTED  12/12/2011    EF 60-65%, MVA 1.7, RSVP 39   • ECHO - CONVERTED   11/08/2016    EF 60-65%, mitral annular ring in place, Mild to mod MS, mild MR   • ECHO - CONVERTED  12/06/2017    EF 65%. Mild MS & MR. RVSP- 38 mmHg   • ECHO - CONVERTED  06/27/2020    EF 65%. LA- 3.8 Cm. AO 3.9 Cm. MV Ring. Mild MR. RVSP- 31 mmHg.   • ECHO - CONVERTED  04/04/2022    EF 65%. Inferoseptal WMA. MVR. Trace-Mild MR. RVSP- 35 mmHg   • MITRAL VALVE REPAIR/REPLACEMENT  03/08/2004    s/p mitral valve repair     Current Outpatient Medications   Medication Sig Dispense Refill   • ascorbic acid (VITAMIN C) 1000 MG tablet Take 1 tablet by mouth Daily.     • aspirin 81 MG EC tablet Take 1 tablet by mouth Daily.     • Cholecalciferol (Vitamin D3) 50 MCG (2000 UT) tablet Take 1 tablet by mouth Daily.     • coenzyme Q10 100 MG capsule Take 1 capsule by mouth Daily.     • diazePAM (VALIUM) 5 MG tablet Take 1 tablet by mouth At Night As Needed for Anxiety.     • hydroCHLOROthiazide (HYDRODIURIL) 12.5 MG tablet Take 1 tablet by mouth Daily As Needed.     • isosorbide mononitrate (IMDUR) 30 MG 24 hr tablet Take 1 tablet by mouth 2 (Two) Times a Day. 180 tablet 2   • lisinopril (PRINIVIL,ZESTRIL) 10 MG tablet Take 1 tablet by mouth As Needed.     • loratadine (CLARITIN) 10 MG tablet Take 1 tablet by mouth Daily As Needed.     • Magnesium 400 MG tablet Take 400 mg by mouth Daily.     • metFORMIN (GLUCOPHAGE) 1000 MG tablet Take 1 tablet by mouth 2 (Two) Times a Day With Meals.     • nitroglycerin (NITROSTAT) 0.4 MG SL tablet Place 1 tablet under the tongue Every 5 (Five) Minutes As Needed for Chest Pain. Take no more than 3 doses in 15 minutes. 25 tablet 1   • Omega-3 Fatty Acids (OMEGA 3 500 PO) Take  by mouth Daily.     • omeprazole (priLOSEC) 20 MG capsule Take 1 capsule by mouth Daily.     • rosuvastatin (CRESTOR) 20 MG tablet Take 1 tablet by mouth Daily. 90 tablet 3   • ticagrelor (Brilinta) 60 MG tablet tablet Take 1 tablet by mouth 2 (Two) Times a Day. 180 tablet 3     No current facility-administered  "medications for this visit.     Phenergan [promethazine hcl]    Past Medical History:   Diagnosis Date   • CAD (coronary artery disease)    • Diabetes Mellitus    • H/O bilateral inguinal hernia repair 06/2021   • History of colonoscopy 02/01/2022   • Hx of Intravertebral disc disease    • Hypertension    • MR (congenital mitral regurgitation)    • Overnight pulse oximetry 02/20/2009    Increase O2, sleep study.   • Robotic Prostatectomy    • Sleep study- [place] 03/04/2009     Social History     Socioeconomic History   • Marital status:    Tobacco Use   • Smoking status: Never   • Smokeless tobacco: Never   Vaping Use   • Vaping Use: Never used   Substance and Sexual Activity   • Alcohol use: Yes     Comment: rarely   • Drug use: No   • Sexual activity: Defer     Family History   Problem Relation Age of Onset   • Heart attack Mother    • Heart disease Mother    • Diabetes Mother    • Cancer Father    • Heart attack Brother    • Heart disease Brother      Review of Systems   Constitutional: Positive for weight loss (-4). Negative for decreased appetite and malaise/fatigue.   HENT: Negative.    Eyes: Negative for blurred vision.   Cardiovascular: Negative for chest pain, dyspnea on exertion, leg swelling, palpitations and syncope.   Respiratory: Negative for shortness of breath and sleep disturbances due to breathing.    Endocrine: Negative.    Hematologic/Lymphatic: Negative for bleeding problem. Does not bruise/bleed easily.   Skin: Negative.    Musculoskeletal: Negative for falls and myalgias.   Gastrointestinal: Negative for abdominal pain, heartburn and melena.   Genitourinary: Negative for hematuria.   Neurological: Negative for dizziness and light-headedness.   Psychiatric/Behavioral: Negative for altered mental status.   Allergic/Immunologic: Negative.         Objective     /72 (BP Location: Left arm)   Pulse 64   Ht 182.9 cm (72.01\")   Wt 70.6 kg (155 lb 9.6 oz)   BMI 21.10 kg/m²     Vitals and " nursing note reviewed.   Constitutional:       General: Not in acute distress.     Appearance: Well-developed. Not diaphoretic.   Eyes:      Pupils: Pupils are equal, round, and reactive to light.   HENT:      Head: Normocephalic.   Pulmonary:      Effort: Pulmonary effort is normal. No respiratory distress.      Breath sounds: Normal breath sounds.   Cardiovascular:      Normal rate. Regular rhythm.   Pulses:     Intact distal pulses.   Edema:     Ankle: bilateral trace edema of the ankle.  Abdominal:      General: Bowel sounds are normal.      Palpations: Abdomen is soft.   Musculoskeletal: Normal range of motion.      Cervical back: Normal range of motion. Skin:     General: Skin is warm and dry.   Neurological:      Mental Status: Alert and oriented to person, place, and time.        Procedures          Problem List Items Addressed This Visit        Cardiac and Vasculature    Hyperlipemia    Relevant Medications    rosuvastatin (CRESTOR) 20 MG tablet    S/P mitral valve repair    HTN (hypertension)    CAD (coronary artery disease) - Primary    Relevant Medications    isosorbide mononitrate (IMDUR) 30 MG 24 hr tablet    nitroglycerin (NITROSTAT) 0.4 MG SL tablet    ticagrelor (Brilinta) 60 MG tablet tablet    PVC (premature ventricular contraction)    Relevant Medications    isosorbide mononitrate (IMDUR) 30 MG 24 hr tablet    nitroglycerin (NITROSTAT) 0.4 MG SL tablet    ticagrelor (Brilinta) 60 MG tablet tablet    PAC (premature atrial contraction)    Relevant Medications    isosorbide mononitrate (IMDUR) 30 MG 24 hr tablet    nitroglycerin (NITROSTAT) 0.4 MG SL tablet    ticagrelor (Brilinta) 60 MG tablet tablet       Endocrine and Metabolic    Diabetes      1. IHD- Stenting to RCA and OM 6/24/22. Reduce Brilinta to maintenance dose 60 mg BID next month. No anginal pain. Continue Imdur for 65% LAD managed medically.      2. HTN- stable, continue same plan.      3. Hypercholesterolemia- Lipitor 20 mg changed to  Crestor 20 mg. Repeat lipids excellent 8/2022 with LDL 97 improving to 59. Continue same plan.       4. Mitral repair- clinically stable.      5. Diabetes- well controlled with A1C 6.5%. Continue aspirin, statin, ACE.     No changes. Follow up in 6 months.     BMI is within normal parameters. No other follow-up for BMI required.            Electronically signed by DEONTE Ramsey,  May 26, 2023 08:57 EDT

## 2023-05-23 NOTE — LETTER
May 26, 2023       No Recipients    Patient: Burak Balbuena   YOB: 1945   Date of Visit: 5/23/2023       Dear Waldemar Morales MD    Burak Balbunea was in my office today. Below is a copy of my note.    If you have questions, please do not hesitate to call me. I look forward to following Burak along with you.         Sincerely,        DEONTE Ramsey        CC:   No Recipients    Chief Complaint   Patient presents with   • Follow-up     Cardiac management   • Lab     Last labs in March per PCP. He also had CT of chest and pelvis in April.   • Med Refill     Needs refills on cardiac medications including Nitro sl-90 day.     Subjective       Burak Balbuena is an 78 y.o. male with HTN, DM, hyperlipidemia, mitral valve disease, and IHD diagnosed in 2003 when he had stenting followed by mitral valve repair in 2004. He tried sotalol for management of PVC in the past but did not tolerate. Echocardiogram 6/27/2020 showed LVEF 65%, presence of mitral valve ring with mild mitral regurg.  AO 3.9 cm. He returned April 2022 with increasing dyspnea with climbing and overexerting. Stress showed inferior ischemia. Cardiac cath revealed 85% RCA and had single stent, OM1 received two Resolute stents and moderate disease of the LAD.       He returns today for follow up with his wife. He is doing well from a cardiac standpoint. No CP. Shortness of breath remains improved. Tolerating Crestor, DAPT. Labs 8/2022 showed LDL improved to 59, HDL 43, Tri 93. A1C remained same at 6.5%. Rechecked in March with Dr. Morales. Refills requested.         Cardiac History:    Past Surgical History:   Procedure Laterality Date   • CARDIAC CATHETERIZATION  01/09/2004    EF 65%, 4 +MR, patent stent LCX   • CARDIAC CATHETERIZATION  01/19/2004    80% LCX- 2.5x13 & 2.5x8 Pixel Stents   • CARDIAC CATHETERIZATION  06/24/2022    85% RCA- 3.5x12. 99% OM1- 2.5x30 & 2.5x8 Resolute. 65% Mid LAD   • CARDIOVASCULAR STRESS TEST   12/12/2011    EF 65%, 6 min 7 sec, 78% THR, 160/62, no ischemia   • CARDIOVASCULAR STRESS TEST  12/06/2017    8 Min,10 Secs. 82% THR. BP- 188/61. Negative   • CARDIOVASCULAR STRESS TEST  04/05/2022    Lexiscan- EF 61%. Inferior Ischemia   • CONVERTED (HISTORICAL) HOLTER  12/18/2017    Holter- 24 hr- avg 66, freq PVC, 4.5%   • ECHO - CONVERTED  12/12/2011    EF 60-65%, MVA 1.7, RSVP 39   • ECHO - CONVERTED  11/08/2016    EF 60-65%, mitral annular ring in place, Mild to mod MS, mild MR   • ECHO - CONVERTED  12/06/2017    EF 65%. Mild MS & MR. RVSP- 38 mmHg   • ECHO - CONVERTED  06/27/2020    EF 65%. LA- 3.8 Cm. AO 3.9 Cm. MV Ring. Mild MR. RVSP- 31 mmHg.   • ECHO - CONVERTED  04/04/2022    EF 65%. Inferoseptal WMA. MVR. Trace-Mild MR. RVSP- 35 mmHg   • MITRAL VALVE REPAIR/REPLACEMENT  03/08/2004    s/p mitral valve repair     Current Outpatient Medications   Medication Sig Dispense Refill   • ascorbic acid (VITAMIN C) 1000 MG tablet Take 1 tablet by mouth Daily.     • aspirin 81 MG EC tablet Take 1 tablet by mouth Daily.     • Cholecalciferol (Vitamin D3) 50 MCG (2000 UT) tablet Take 1 tablet by mouth Daily.     • coenzyme Q10 100 MG capsule Take 1 capsule by mouth Daily.     • diazePAM (VALIUM) 5 MG tablet Take 1 tablet by mouth At Night As Needed for Anxiety.     • hydroCHLOROthiazide (HYDRODIURIL) 12.5 MG tablet Take 1 tablet by mouth Daily As Needed.     • isosorbide mononitrate (IMDUR) 30 MG 24 hr tablet Take 1 tablet by mouth 2 (Two) Times a Day. 180 tablet 2   • lisinopril (PRINIVIL,ZESTRIL) 10 MG tablet Take 1 tablet by mouth As Needed.     • loratadine (CLARITIN) 10 MG tablet Take 1 tablet by mouth Daily As Needed.     • Magnesium 400 MG tablet Take 400 mg by mouth Daily.     • metFORMIN (GLUCOPHAGE) 1000 MG tablet Take 1 tablet by mouth 2 (Two) Times a Day With Meals.     • nitroglycerin (NITROSTAT) 0.4 MG SL tablet Place 1 tablet under the tongue Every 5 (Five) Minutes As Needed for Chest Pain. Take no more  than 3 doses in 15 minutes. 25 tablet 1   • Omega-3 Fatty Acids (OMEGA 3 500 PO) Take  by mouth Daily.     • omeprazole (priLOSEC) 20 MG capsule Take 1 capsule by mouth Daily.     • rosuvastatin (CRESTOR) 20 MG tablet Take 1 tablet by mouth Daily. 90 tablet 3   • ticagrelor (Brilinta) 60 MG tablet tablet Take 1 tablet by mouth 2 (Two) Times a Day. 180 tablet 3     No current facility-administered medications for this visit.     Phenergan [promethazine hcl]    Past Medical History:   Diagnosis Date   • CAD (coronary artery disease)    • Diabetes Mellitus    • H/O bilateral inguinal hernia repair 06/2021   • History of colonoscopy 02/01/2022   • Hx of Intravertebral disc disease    • Hypertension    • MR (congenital mitral regurgitation)    • Overnight pulse oximetry 02/20/2009    Increase O2, sleep study.   • Robotic Prostatectomy    • Sleep study- [facility] 03/04/2009     Social History     Socioeconomic History   • Marital status:    Tobacco Use   • Smoking status: Never   • Smokeless tobacco: Never   Vaping Use   • Vaping Use: Never used   Substance and Sexual Activity   • Alcohol use: Yes     Comment: rarely   • Drug use: No   • Sexual activity: Defer     Family History   Problem Relation Age of Onset   • Heart attack Mother    • Heart disease Mother    • Diabetes Mother    • Cancer Father    • Heart attack Brother    • Heart disease Brother      Review of Systems   Constitutional: Positive for weight loss (-4). Negative for decreased appetite and malaise/fatigue.   HENT: Negative.    Eyes: Negative for blurred vision.   Cardiovascular: Negative for chest pain, dyspnea on exertion, leg swelling, palpitations and syncope.   Respiratory: Negative for shortness of breath and sleep disturbances due to breathing.    Endocrine: Negative.    Hematologic/Lymphatic: Negative for bleeding problem. Does not bruise/bleed easily.   Skin: Negative.    Musculoskeletal: Negative for falls and myalgias.   Gastrointestinal:  "Negative for abdominal pain, heartburn and melena.   Genitourinary: Negative for hematuria.   Neurological: Negative for dizziness and light-headedness.   Psychiatric/Behavioral: Negative for altered mental status.   Allergic/Immunologic: Negative.         Objective      /72 (BP Location: Left arm)   Pulse 64   Ht 182.9 cm (72.01\")   Wt 70.6 kg (155 lb 9.6 oz)   BMI 21.10 kg/m²     Vitals and nursing note reviewed.   Constitutional:       General: Not in acute distress.     Appearance: Well-developed. Not diaphoretic.   Eyes:      Pupils: Pupils are equal, round, and reactive to light.   HENT:      Head: Normocephalic.   Pulmonary:      Effort: Pulmonary effort is normal. No respiratory distress.      Breath sounds: Normal breath sounds.   Cardiovascular:      Normal rate. Regular rhythm.   Pulses:     Intact distal pulses.   Edema:     Ankle: bilateral trace edema of the ankle.  Abdominal:      General: Bowel sounds are normal.      Palpations: Abdomen is soft.   Musculoskeletal: Normal range of motion.      Cervical back: Normal range of motion. Skin:     General: Skin is warm and dry.   Neurological:      Mental Status: Alert and oriented to person, place, and time.        Procedures         Problem List Items Addressed This Visit          Cardiac and Vasculature    Hyperlipemia    Relevant Medications    rosuvastatin (CRESTOR) 20 MG tablet    S/P mitral valve repair    HTN (hypertension)    CAD (coronary artery disease) - Primary    Relevant Medications    isosorbide mononitrate (IMDUR) 30 MG 24 hr tablet    nitroglycerin (NITROSTAT) 0.4 MG SL tablet    ticagrelor (Brilinta) 60 MG tablet tablet    PVC (premature ventricular contraction)    Relevant Medications    isosorbide mononitrate (IMDUR) 30 MG 24 hr tablet    nitroglycerin (NITROSTAT) 0.4 MG SL tablet    ticagrelor (Brilinta) 60 MG tablet tablet    PAC (premature atrial contraction)    Relevant Medications    isosorbide mononitrate (IMDUR) 30 MG " 24 hr tablet    nitroglycerin (NITROSTAT) 0.4 MG SL tablet    ticagrelor (Brilinta) 60 MG tablet tablet       Endocrine and Metabolic    Diabetes      1. IHD- Stenting to RCA and OM 6/24/22. Reduce Brilinta to maintenance dose 60 mg BID next month. No anginal pain. Continue Imdur for 65% LAD managed medically.      2. HTN- stable, continue same plan.      3. Hypercholesterolemia- Lipitor 20 mg changed to Crestor 20 mg. Repeat lipids excellent 8/2022 with LDL 97 improving to 59. Continue same plan.       4. Mitral repair- clinically stable.      5. Diabetes- well controlled with A1C 6.5%. Continue aspirin, statin, ACE.     No changes. Follow up in 6 months.     BMI is within normal parameters. No other follow-up for BMI required.           Electronically signed by DEONTE Ramsey,  May 26, 2023 08:57 EDT

## 2023-10-24 ENCOUNTER — TELEPHONE (OUTPATIENT)
Dept: CARDIOLOGY | Facility: CLINIC | Age: 78
End: 2023-10-24

## 2023-10-24 NOTE — TELEPHONE ENCOUNTER
I spoke with patients wife Jaimee , she reports he has been having chest pressure , SOA and fatigue with minimal  exertion.  He is having to stop to sit down and rest with ambulating short distances.  He has also  been requiring Nitro SL tabs , taking 2 tablets before any relief . She said he has been having to take them more frequently.  I spoke with Jennifer at Dr Rosales's office and made her aware of scheduled appointment tomorrow

## 2023-10-25 ENCOUNTER — OFFICE VISIT (OUTPATIENT)
Dept: CARDIOLOGY | Facility: CLINIC | Age: 78
End: 2023-10-25
Payer: MEDICARE

## 2023-10-25 VITALS
DIASTOLIC BLOOD PRESSURE: 64 MMHG | BODY MASS INDEX: 20.91 KG/M2 | SYSTOLIC BLOOD PRESSURE: 130 MMHG | WEIGHT: 154.4 LBS | HEART RATE: 64 BPM | HEIGHT: 72 IN

## 2023-10-25 DIAGNOSIS — E11.69 TYPE 2 DIABETES MELLITUS WITH OTHER SPECIFIED COMPLICATION, WITHOUT LONG-TERM CURRENT USE OF INSULIN: ICD-10-CM

## 2023-10-25 DIAGNOSIS — I49.1 PAC (PREMATURE ATRIAL CONTRACTION): ICD-10-CM

## 2023-10-25 DIAGNOSIS — I25.118 CORONARY ARTERY DISEASE INVOLVING NATIVE CORONARY ARTERY OF NATIVE HEART WITH OTHER FORM OF ANGINA PECTORIS: Primary | ICD-10-CM

## 2023-10-25 DIAGNOSIS — E78.2 MIXED HYPERLIPIDEMIA: ICD-10-CM

## 2023-10-25 DIAGNOSIS — I49.3 PVC (PREMATURE VENTRICULAR CONTRACTION): ICD-10-CM

## 2023-10-25 DIAGNOSIS — R06.02 SHORTNESS OF BREATH: ICD-10-CM

## 2023-10-25 DIAGNOSIS — I10 PRIMARY HYPERTENSION: ICD-10-CM

## 2023-10-25 DIAGNOSIS — R07.89 CHEST DISCOMFORT: ICD-10-CM

## 2023-10-25 DIAGNOSIS — R00.2 PALPITATIONS: ICD-10-CM

## 2023-10-25 PROCEDURE — 3075F SYST BP GE 130 - 139MM HG: CPT | Performed by: NURSE PRACTITIONER

## 2023-10-25 PROCEDURE — 99214 OFFICE O/P EST MOD 30 MIN: CPT | Performed by: NURSE PRACTITIONER

## 2023-10-25 PROCEDURE — 3078F DIAST BP <80 MM HG: CPT | Performed by: NURSE PRACTITIONER

## 2023-10-25 RX ORDER — LISINOPRIL 10 MG/1
10 TABLET ORAL DAILY PRN
Qty: 30 TABLET | Refills: 8 | Status: SHIPPED | OUTPATIENT
Start: 2023-10-25

## 2023-10-25 RX ORDER — ROSUVASTATIN CALCIUM 20 MG/1
20 TABLET, COATED ORAL DAILY
Qty: 30 TABLET | Refills: 8 | Status: SHIPPED | OUTPATIENT
Start: 2023-10-25

## 2023-10-25 RX ORDER — HYDROCHLOROTHIAZIDE 12.5 MG/1
12.5 TABLET ORAL DAILY PRN
Qty: 30 TABLET | Refills: 8 | Status: SHIPPED | OUTPATIENT
Start: 2023-10-25

## 2023-10-25 RX ORDER — NITROGLYCERIN 0.4 MG/1
0.4 TABLET SUBLINGUAL
Qty: 25 TABLET | Refills: 1 | Status: SHIPPED | OUTPATIENT
Start: 2023-10-25

## 2023-10-25 RX ORDER — ISOSORBIDE MONONITRATE 30 MG/1
30 TABLET, EXTENDED RELEASE ORAL 2 TIMES DAILY
Qty: 60 TABLET | Refills: 8 | Status: SHIPPED | OUTPATIENT
Start: 2023-10-25

## 2023-10-25 NOTE — PROGRESS NOTES
Chief Complaint   Patient presents with    Follow-up     Pt is here for cardiac follow up.  Pt states that he has had CP (pressure) with strenuous activity and stress, he states he will also become SOB.  He states he does become dizzy when he gets up from a chair.  He denies palpitations.  He does take a daily ASA.  He is scheduled for CT of his head tomorrow.    Med Refill     Pt request 30 day refills to be sent to [facility].  Medications were verified by med list.      Lab Work     Pt states their last labs were this month with his PCP.         Cardiac Complaints  chest pressure/discomfort, dyspnea, and Dizziness      Subjective   Burak Balbuena is a 78 y.o. male with HTN, DM, hyperlipidemia, mitral valve disease, and IHD diagnosed in 2003 when he had stenting followed by mitral valve repair in 2004. He tried sotalol for management of PVC in the past but did not tolerate. Echocardiogram 6/27/2020 showed LVEF 65%, presence of mitral valve ring with mild mitral regurg.  AO 3.9 cm. He returned April 2022 with increasing dyspnea with climbing and overexerting. Stress showed inferior ischemia. Cardiac cath revealed 85% RCA and had single stent, OM1 received two Resolute stents and moderate disease of the LAD.       He comes today for sooner appointment as he has both CP with exertion and SOA. Patient also admits to becoming dizzy with standing too quick. He denies palpitations/syncope. He does report CT of head tomorrow. Labs with PCP, checked this month. Refills requested.        Cardiac History  Past Surgical History:   Procedure Laterality Date    CARDIAC CATHETERIZATION  01/09/2004    EF 65%, 4 +MR, patent stent LCX    CARDIAC CATHETERIZATION  01/19/2004    80% LCX- 2.5x13 & 2.5x8 Pixel Stents    CARDIAC CATHETERIZATION  06/24/2022    85% RCA- 3.5x12. 99% OM1- 2.5x30 & 2.5x8 Resolute. 65% Mid LAD    CARDIOVASCULAR STRESS TEST  12/12/2011    EF 65%, 6 min 7 sec, 78% THR, 160/62, no ischemia     CARDIOVASCULAR STRESS TEST  12/06/2017    8 Min,10 Secs. 82% THR. BP- 188/61. Negative    CARDIOVASCULAR STRESS TEST  04/05/2022    Lexiscan- EF 61%. Inferior Ischemia    CONVERTED (HISTORICAL) HOLTER  12/18/2017    Holter- 24 hr- avg 66, freq PVC, 4.5%    ECHO - CONVERTED  12/12/2011    EF 60-65%, MVA 1.7, RSVP 39    ECHO - CONVERTED  11/08/2016    EF 60-65%, mitral annular ring in place, Mild to mod MS, mild MR    ECHO - CONVERTED  12/06/2017    EF 65%. Mild MS & MR. RVSP- 38 mmHg    ECHO - CONVERTED  06/27/2020    EF 65%. LA- 3.8 Cm. AO 3.9 Cm. MV Ring. Mild MR. RVSP- 31 mmHg.    ECHO - CONVERTED  04/04/2022    EF 65%. Inferoseptal WMA. MVR. Trace-Mild MR. RVSP- 35 mmHg    MITRAL VALVE REPAIR/REPLACEMENT  03/08/2004    s/p mitral valve repair       Current Outpatient Medications   Medication Sig Dispense Refill    ascorbic acid (VITAMIN C) 1000 MG tablet Take 1 tablet by mouth Daily.      aspirin 81 MG EC tablet Take 1 tablet by mouth Daily.      Calcium Carb-Cholecalciferol (CALCIUM 1000 + D PO) Take  by mouth.      Cholecalciferol (Vitamin D3) 50 MCG (2000 UT) tablet Take 1 tablet by mouth Daily.      coenzyme Q10 100 MG capsule Take 1 capsule by mouth Daily.      diazePAM (VALIUM) 5 MG tablet Take 1 tablet by mouth At Night As Needed for Anxiety.      hydroCHLOROthiazide (HYDRODIURIL) 12.5 MG tablet Take 1 tablet by mouth Daily As Needed (as needed for BP management SBP >140). 30 tablet 8    isosorbide mononitrate (IMDUR) 30 MG 24 hr tablet Take 1 tablet by mouth 2 (Two) Times a Day. 60 tablet 8    lisinopril (PRINIVIL,ZESTRIL) 10 MG tablet Take 1 tablet by mouth Daily As Needed (as needed for SBP >140). 30 tablet 8    loratadine (CLARITIN) 10 MG tablet Take 1 tablet by mouth Daily As Needed.      Magnesium 400 MG tablet Take 400 mg by mouth Daily.      metFORMIN (GLUCOPHAGE) 1000 MG tablet Take 1 tablet by mouth 2 (Two) Times a Day With Meals.      nitroglycerin (NITROSTAT) 0.4 MG SL tablet Place 1 tablet  under the tongue Every 5 (Five) Minutes As Needed for Chest Pain. Take no more than 3 doses in 15 minutes. 25 tablet 1    Omega-3 Fatty Acids (OMEGA 3 500 PO) Take  by mouth Daily.      omeprazole (priLOSEC) 20 MG capsule Take 1 capsule by mouth Daily.      rosuvastatin (CRESTOR) 20 MG tablet Take 1 tablet by mouth Daily. 30 tablet 8    ticagrelor (Brilinta) 90 MG tablet tablet Take 1 tablet by mouth 2 (Two) Times a Day. 60 tablet 3     No current facility-administered medications for this visit.       Phenergan [promethazine hcl]    Past Medical History:   Diagnosis Date    CAD (coronary artery disease)     Diabetes Mellitus     H/O bilateral inguinal hernia repair 06/2021    History of colonoscopy 02/01/2022    Hx of Intravertebral disc disease     Hypertension     MR (congenital mitral regurgitation)     Overnight pulse oximetry 02/20/2009    Increase O2, sleep study.    Robotic Prostatectomy     Sleep study- [facility] 03/04/2009       Social History     Socioeconomic History    Marital status:    Tobacco Use    Smoking status: Never    Smokeless tobacco: Never   Vaping Use    Vaping Use: Never used   Substance and Sexual Activity    Alcohol use: Yes     Comment: rarely    Drug use: No    Sexual activity: Defer       Family History   Problem Relation Age of Onset    Heart attack Mother     Heart disease Mother     Diabetes Mother     Cancer Father     Heart attack Brother     Heart disease Brother        Review of Systems   Constitutional: Negative for malaise/fatigue and night sweats.   Cardiovascular:  Positive for chest pain, dyspnea on exertion and palpitations. Negative for claudication, irregular heartbeat, leg swelling, near-syncope, orthopnea and syncope.   Respiratory:  Positive for shortness of breath. Negative for cough and wheezing.    Musculoskeletal:  Negative for back pain, joint pain and stiffness.   Gastrointestinal:  Negative for anorexia, heartburn, nausea and vomiting.   Genitourinary:   "Negative for dysuria, hematuria, hesitancy and nocturia.   Neurological:  Positive for dizziness. Negative for headaches and light-headedness.   Psychiatric/Behavioral:  Negative for depression and memory loss. The patient is not nervous/anxious.            Objective     /64 (BP Location: Left arm, Patient Position: Sitting)   Pulse 64   Ht 182.9 cm (72\")   Wt 70 kg (154 lb 6.4 oz)   BMI 20.94 kg/m²     Constitutional:       Appearance: Not in distress.   Eyes:      Pupils: Pupils are equal, round, and reactive to light.   HENT:      Nose: Nose normal.   Pulmonary:      Effort: Pulmonary effort is normal.      Breath sounds: Normal breath sounds.   Cardiovascular:      PMI at left midclavicular line. Normal rate. Regular rhythm.      Murmurs: There is a systolic murmur.   Abdominal:      Palpations: Abdomen is soft.   Musculoskeletal: Normal range of motion.      Cervical back: Normal range of motion and neck supple. Skin:     General: Skin is warm and dry.   Neurological:      Mental Status: Alert.         Procedures         Diagnoses and all orders for this visit:    1. Coronary artery disease involving native coronary artery of native heart with other form of angina pectoris (Primary)  -     Stress Test With Myocardial Perfusion One Day; Future    2. Chest discomfort  -     Stress Test With Myocardial Perfusion One Day; Future    3. Primary hypertension    4. Mixed hyperlipidemia    5. PVC (premature ventricular contraction)    6. PAC (premature atrial contraction)    7. Shortness of breath    8. Palpitations    9. Type 2 diabetes mellitus with other specified complication, without long-term current use of insulin    Other orders  -     ticagrelor (Brilinta) 90 MG tablet tablet; Take 1 tablet by mouth 2 (Two) Times a Day.  Dispense: 60 tablet; Refill: 3  -     hydroCHLOROthiazide (HYDRODIURIL) 12.5 MG tablet; Take 1 tablet by mouth Daily As Needed (as needed for BP management SBP >140).  Dispense: 30 " tablet; Refill: 8  -     isosorbide mononitrate (IMDUR) 30 MG 24 hr tablet; Take 1 tablet by mouth 2 (Two) Times a Day.  Dispense: 60 tablet; Refill: 8  -     lisinopril (PRINIVIL,ZESTRIL) 10 MG tablet; Take 1 tablet by mouth Daily As Needed (as needed for SBP >140).  Dispense: 30 tablet; Refill: 8  -     rosuvastatin (CRESTOR) 20 MG tablet; Take 1 tablet by mouth Daily.  Dispense: 30 tablet; Refill: 8  -     nitroglycerin (NITROSTAT) 0.4 MG SL tablet; Place 1 tablet under the tongue Every 5 (Five) Minutes As Needed for Chest Pain. Take no more than 3 doses in 15 minutes.  Dispense: 25 tablet; Refill: 1             IHD: More CP and SOA with exertion. Will continue Brilinta 90mg BID and ASA, bleed and bruise denied. Continue with imdur BID, NTG urged to be used as needed. Repeat stress testing encouraged to assess for ischemia and LV dysfunction. More recommendations to follow.    HTN: Only taking his lisinopril and HCTZ as needed for BP management. Will continue without. Limited sodium urged.    Hyperlipidemia: On statin therapy with crestor. FLP with you. Continue same. Limited carb diet urged. Can we have labs?    DM: Taking gluocphage therapy. AIC with your office. Wife admits 7.2% at recent check. Limited carb diet urged.    Dizziness: Noted, has CT of head tomorrow.    Anemia: Being worked up with your office. Active bleeding denied.    Refills per request    BMI noted 20.94, good cardiac ADA diet encouraged.    6 month follow up advised, sooner if needed.          Problems Addressed this Visit          Cardiac and Vasculature    Hyperlipemia    Relevant Medications    rosuvastatin (CRESTOR) 20 MG tablet    HTN (hypertension)    Relevant Medications    hydroCHLOROthiazide (HYDRODIURIL) 12.5 MG tablet    lisinopril (PRINIVIL,ZESTRIL) 10 MG tablet    CAD (coronary artery disease) - Primary    Relevant Medications    ticagrelor (Brilinta) 90 MG tablet tablet    isosorbide mononitrate (IMDUR) 30 MG 24 hr tablet     nitroglycerin (NITROSTAT) 0.4 MG SL tablet    Other Relevant Orders    Stress Test With Myocardial Perfusion One Day    PVC (premature ventricular contraction)    Relevant Medications    ticagrelor (Brilinta) 90 MG tablet tablet    isosorbide mononitrate (IMDUR) 30 MG 24 hr tablet    nitroglycerin (NITROSTAT) 0.4 MG SL tablet    PAC (premature atrial contraction)    Relevant Medications    ticagrelor (Brilinta) 90 MG tablet tablet    isosorbide mononitrate (IMDUR) 30 MG 24 hr tablet    nitroglycerin (NITROSTAT) 0.4 MG SL tablet       Endocrine and Metabolic    Diabetes     Other Visit Diagnoses       Chest discomfort        Relevant Orders    Stress Test With Myocardial Perfusion One Day    Shortness of breath        Palpitations              Diagnoses         Codes Comments    Coronary artery disease involving native coronary artery of native heart with other form of angina pectoris    -  Primary ICD-10-CM: I25.118  ICD-9-CM: 414.01, 413.9     Chest discomfort     ICD-10-CM: R07.89  ICD-9-CM: 786.59     Primary hypertension     ICD-10-CM: I10  ICD-9-CM: 401.9     Mixed hyperlipidemia     ICD-10-CM: E78.2  ICD-9-CM: 272.2     PVC (premature ventricular contraction)     ICD-10-CM: I49.3  ICD-9-CM: 427.69     PAC (premature atrial contraction)     ICD-10-CM: I49.1  ICD-9-CM: 427.61     Shortness of breath     ICD-10-CM: R06.02  ICD-9-CM: 786.05     Palpitations     ICD-10-CM: R00.2  ICD-9-CM: 785.1     Type 2 diabetes mellitus with other specified complication, without long-term current use of insulin     ICD-10-CM: E11.69  ICD-9-CM: 250.80                     Electronically signed by Renay Alexander, APRN October 25, 2023 10:01 EDT

## 2023-10-30 RX ORDER — CLOPIDOGREL BISULFATE 75 MG/1
75 TABLET ORAL DAILY
Qty: 90 TABLET | Refills: 3 | Status: SHIPPED | OUTPATIENT
Start: 2023-10-30

## 2023-10-30 NOTE — TELEPHONE ENCOUNTER
Patient's wife, Jaimee, called stating that Brilinta is too expensive it is now $157 a month when they had been paying $47. They are asking if it can be changed to plavix? Patient had stenting on 06/24/22.

## 2023-10-30 NOTE — TELEPHONE ENCOUNTER
Script for plavix 75 mg daily is pended to be sent to [facility] in [place]. 90 tablets and 3 refills are pended to be sent.

## 2023-11-06 ENCOUNTER — HOSPITAL ENCOUNTER (OUTPATIENT)
Dept: CARDIOLOGY | Facility: HOSPITAL | Age: 78
Discharge: HOME | End: 2023-11-06
Payer: MEDICARE

## 2023-11-06 DIAGNOSIS — R07.89 CHEST DISCOMFORT: ICD-10-CM

## 2023-11-06 DIAGNOSIS — I25.118 CORONARY ARTERY DISEASE INVOLVING NATIVE CORONARY ARTERY OF NATIVE HEART WITH OTHER FORM OF ANGINA PECTORIS: ICD-10-CM

## 2023-11-06 LAB
BH CV REST NUCLEAR ISOTOPE DOSE: 10 MCI
BH CV STRESS COMMENTS STAGE 1: NORMAL
BH CV STRESS DOSE REGADENOSON STAGE 1: 0.4
BH CV STRESS DURATION MIN STAGE 1: 0
BH CV STRESS DURATION SEC STAGE 1: 10
BH CV STRESS NUCLEAR ISOTOPE DOSE: 30 MCI
BH CV STRESS PROTOCOL 1: NORMAL
BH CV STRESS RECOVERY BP: NORMAL MMHG
BH CV STRESS RECOVERY HR: 73 BPM
BH CV STRESS STAGE 1: 1
LV EF NUC BP: 63 %
MAXIMAL PREDICTED HEART RATE: 140 BPM
PERCENT MAX PREDICTED HR: 53.57 %
STRESS BASELINE BP: NORMAL MMHG
STRESS BASELINE HR: 68 BPM
STRESS PERCENT HR: 63 %
STRESS POST PEAK BP: NORMAL MMHG
STRESS POST PEAK HR: 75 BPM
STRESS TARGET HR: 119 BPM

## 2023-11-06 PROCEDURE — A9500 TC99M SESTAMIBI: HCPCS | Performed by: INTERNAL MEDICINE

## 2023-11-06 PROCEDURE — 0 TECHNETIUM SESTAMIBI: Performed by: INTERNAL MEDICINE

## 2023-11-06 PROCEDURE — 78452 HT MUSCLE IMAGE SPECT MULT: CPT

## 2023-11-06 PROCEDURE — 93018 CV STRESS TEST I&R ONLY: CPT | Performed by: INTERNAL MEDICINE

## 2023-11-06 PROCEDURE — 25010000002 REGADENOSON 0.4 MG/5ML SOLUTION: Performed by: INTERNAL MEDICINE

## 2023-11-06 PROCEDURE — 93017 CV STRESS TEST TRACING ONLY: CPT

## 2023-11-06 PROCEDURE — 78452 HT MUSCLE IMAGE SPECT MULT: CPT | Performed by: INTERNAL MEDICINE

## 2023-11-06 RX ORDER — REGADENOSON 0.08 MG/ML
0.4 INJECTION, SOLUTION INTRAVENOUS
Status: COMPLETED | OUTPATIENT
Start: 2023-11-06 | End: 2023-11-06

## 2023-11-06 RX ADMIN — REGADENOSON 0.4 MG: 0.08 INJECTION, SOLUTION INTRAVENOUS at 11:16

## 2023-11-06 RX ADMIN — TECHNETIUM TC 99M SESTAMIBI 1 DOSE: 1 INJECTION INTRAVENOUS at 11:16

## 2023-11-06 RX ADMIN — TECHNETIUM TC 99M SESTAMIBI 1 DOSE: 1 INJECTION INTRAVENOUS at 09:47

## 2023-11-06 NOTE — PROGRESS NOTES
Very small apical ischemia, add ranexa 500mg BID, EF 63%. If still pain after adding, cath will be advised.

## 2023-11-07 RX ORDER — RANOLAZINE 500 MG/1
500 TABLET, EXTENDED RELEASE ORAL 2 TIMES DAILY
Qty: 60 TABLET | Refills: 11 | Status: SHIPPED | OUTPATIENT
Start: 2023-11-07

## 2023-11-07 NOTE — TELEPHONE ENCOUNTER
----- Message from DEONTE Wahl sent at 11/6/2023  4:31 PM EST -----  Very small apical ischemia, add ranexa 500mg BID, EF 63%. If still pain after adding, cath will be advised.

## 2023-12-18 RX ORDER — NITROGLYCERIN 0.4 MG/1
TABLET SUBLINGUAL
Qty: 25 TABLET | Refills: 1 | Status: SHIPPED | OUTPATIENT
Start: 2023-12-18

## 2024-05-02 ENCOUNTER — OFFICE VISIT (OUTPATIENT)
Dept: CARDIOLOGY | Facility: CLINIC | Age: 79
End: 2024-05-02
Payer: MEDICARE

## 2024-05-02 VITALS
WEIGHT: 155.2 LBS | HEIGHT: 72 IN | SYSTOLIC BLOOD PRESSURE: 110 MMHG | DIASTOLIC BLOOD PRESSURE: 60 MMHG | HEART RATE: 68 BPM | BODY MASS INDEX: 21.02 KG/M2

## 2024-05-02 DIAGNOSIS — E11.69 TYPE 2 DIABETES MELLITUS WITH OTHER SPECIFIED COMPLICATION, WITHOUT LONG-TERM CURRENT USE OF INSULIN: ICD-10-CM

## 2024-05-02 DIAGNOSIS — R42 DIZZINESS: ICD-10-CM

## 2024-05-02 DIAGNOSIS — E78.2 MIXED HYPERLIPIDEMIA: ICD-10-CM

## 2024-05-02 DIAGNOSIS — I49.3 PVC (PREMATURE VENTRICULAR CONTRACTION): ICD-10-CM

## 2024-05-02 DIAGNOSIS — I10 PRIMARY HYPERTENSION: ICD-10-CM

## 2024-05-02 DIAGNOSIS — Z98.890 S/P MITRAL VALVE REPAIR: ICD-10-CM

## 2024-05-02 DIAGNOSIS — I25.10 CORONARY ARTERY DISEASE INVOLVING NATIVE CORONARY ARTERY OF NATIVE HEART WITHOUT ANGINA PECTORIS: Primary | ICD-10-CM

## 2024-05-02 DIAGNOSIS — R01.1 CARDIAC MURMUR: ICD-10-CM

## 2024-05-02 PROCEDURE — 99214 OFFICE O/P EST MOD 30 MIN: CPT | Performed by: NURSE PRACTITIONER

## 2024-05-02 PROCEDURE — 3078F DIAST BP <80 MM HG: CPT | Performed by: NURSE PRACTITIONER

## 2024-05-02 PROCEDURE — 3074F SYST BP LT 130 MM HG: CPT | Performed by: NURSE PRACTITIONER

## 2024-05-02 RX ORDER — ISOSORBIDE MONONITRATE 30 MG/1
30 TABLET, EXTENDED RELEASE ORAL 2 TIMES DAILY
Qty: 180 TABLET | Refills: 2 | Status: SHIPPED | OUTPATIENT
Start: 2024-05-02

## 2024-05-02 RX ORDER — ROSUVASTATIN CALCIUM 20 MG/1
20 TABLET, COATED ORAL DAILY
Qty: 90 TABLET | Refills: 2 | Status: SHIPPED | OUTPATIENT
Start: 2024-05-02

## 2024-05-02 RX ORDER — CLOPIDOGREL BISULFATE 75 MG/1
75 TABLET ORAL DAILY
Qty: 90 TABLET | Refills: 3 | Status: SHIPPED | OUTPATIENT
Start: 2024-05-02

## 2024-05-02 RX ORDER — LISINOPRIL 5 MG/1
5 TABLET ORAL DAILY
Qty: 90 TABLET | Refills: 3 | Status: SHIPPED | OUTPATIENT
Start: 2024-05-02

## 2024-05-02 RX ORDER — LISINOPRIL 5 MG/1
5 TABLET ORAL DAILY
Qty: 90 TABLET | Refills: 3 | Status: SHIPPED | OUTPATIENT
Start: 2024-05-02 | End: 2024-05-02

## 2024-05-02 NOTE — PROGRESS NOTES
Chief Complaint   Patient presents with    Follow-up     Pt is here for cardiac follow up.  Pt reports some dizziness when he first gets up.  He denies CP, SOB or palpitations.  He does take a daily ASA.      Med Refill     Pt request 30 day refills to be sent to [facility].  Medications were verified by the pt.      Lab Work     Pt states their last labs were last month with his PCP.         Cardiac Complaints  Dizziness      Subjective   Burak Balbuena is a 79 y.o. male with HTN, DM, hyperlipidemia, mitral valve disease, and IHD diagnosed in 2003 when he had stenting followed by mitral valve repair in 2004. He tried sotalol for management of PVC in the past but did not tolerate. Echocardiogram 6/27/2020 showed LVEF 65%, presence of mitral valve ring with mild mitral regurg.  AO 3.9 cm. He returned April 2022 with increasing dyspnea with climbing and overexerting. Stress showed inferior ischemia. Cardiac cath revealed 85% RCA and had single stent, OM1 received two Resolute stents and moderate disease of the LAD.  11/6/2023 showed small apical ischemia with EF of 63%, ranexa 500mg BID added.     He comes today for follow up and denies any new concerns. No CP, SOA, palpitations, dizziness, or syncope noted. He does admit to small dizziness, with standing too quickly. Labs with PCP, checked a month ago, no current available. Refills requested.        Cardiac History  Past Surgical History:   Procedure Laterality Date    CARDIAC CATHETERIZATION  01/09/2004    EF 65%, 4 +MR, patent stent LCX    CARDIAC CATHETERIZATION  01/19/2004    80% LCX- 2.5x13 & 2.5x8 Pixel Stents    CARDIAC CATHETERIZATION  06/24/2022    85% RCA- 3.5x12. 99% OM1- 2.5x30 & 2.5x8 Resolute. 65% Mid LAD    CARDIOVASCULAR STRESS TEST  12/12/2011    EF 65%, 6 min 7 sec, 78% THR, 160/62, no ischemia    CARDIOVASCULAR STRESS TEST  12/06/2017    8 Min,10 Secs. 82% THR. BP- 188/61. Negative    CARDIOVASCULAR STRESS TEST  04/05/2022    Lexiscan- EF 61%.  Inferior Ischemia    CARDIOVASCULAR STRESS TEST  11/06/2023    Lexiscan- EF 63%. Apical Ischemia    CONVERTED (HISTORICAL) HOLTER  12/18/2017    Holter- 24 hr- avg 66, freq PVC, 4.5%    ECHO - CONVERTED  12/12/2011    EF 60-65%, MVA 1.7, RSVP 39    ECHO - CONVERTED  11/08/2016    EF 60-65%, mitral annular ring in place, Mild to mod MS, mild MR    ECHO - CONVERTED  12/06/2017    EF 65%. Mild MS & MR. RVSP- 38 mmHg    ECHO - CONVERTED  06/27/2020    EF 65%. LA- 3.8 Cm. AO 3.9 Cm. MV Ring. Mild MR. RVSP- 31 mmHg.    ECHO - CONVERTED  04/04/2022    EF 65%. Inferoseptal WMA. MVR. Trace-Mild MR. RVSP- 35 mmHg    MITRAL VALVE REPAIR/REPLACEMENT  03/08/2004    s/p mitral valve repair       Current Outpatient Medications   Medication Sig Dispense Refill    ascorbic acid (VITAMIN C) 1000 MG tablet Take 1 tablet by mouth Daily.      aspirin 81 MG EC tablet Take 1 tablet by mouth Daily.      Calcium Carb-Cholecalciferol (CALCIUM 1000 + D PO) Take  by mouth.      Cholecalciferol (Vitamin D3) 50 MCG (2000 UT) tablet Take 1 tablet by mouth Daily.      clopidogrel (PLAVIX) 75 MG tablet Take 1 tablet by mouth Daily. Discontinue Brilinta 90 tablet 3    coenzyme Q10 100 MG capsule Take 1 capsule by mouth Daily.      isosorbide mononitrate (IMDUR) 30 MG 24 hr tablet Take 1 tablet by mouth 2 (Two) Times a Day. 180 tablet 2    loratadine (CLARITIN) 10 MG tablet Take 1 tablet by mouth Daily As Needed.      metFORMIN (GLUCOPHAGE) 1000 MG tablet Take 1 tablet by mouth Daily With Breakfast.      nitroglycerin (NITROSTAT) 0.4 MG SL tablet Dissolve 1 tablet under tongue at onset of chest pain. May repeat every 5 minutes for up to 3 times total if you need. Call 911 & seek emergency care if no relief with 3rd tablet. 25 tablet 1    Omega-3 Fatty Acids (OMEGA 3 500 PO) Take  by mouth Daily.      ranolazine (Ranexa) 500 MG 12 hr tablet Take 1 tablet by mouth 2 (Two) Times a Day. 60 tablet 11    rosuvastatin (CRESTOR) 20 MG tablet Take 1 tablet by  "mouth Daily. 90 tablet 2    lisinopril (PRINIVIL,ZESTRIL) 5 MG tablet Take 1 tablet by mouth Daily. 90 tablet 3     No current facility-administered medications for this visit.       Phenergan [promethazine hcl]    Past Medical History:   Diagnosis Date    CAD (coronary artery disease)     Diabetes Mellitus     H/O bilateral inguinal hernia repair 06/2021    History of colonoscopy 02/01/2022    Hx of Intravertebral disc disease     Hypertension     MR (congenital mitral regurgitation)     Overnight pulse oximetry 02/20/2009    Increase O2, sleep study.    Robotic Prostatectomy     Sleep study- [facility] 03/04/2009       Social History     Socioeconomic History    Marital status:    Tobacco Use    Smoking status: Never    Smokeless tobacco: Never   Vaping Use    Vaping status: Never Used   Substance and Sexual Activity    Alcohol use: Yes     Comment: rarely    Drug use: No    Sexual activity: Defer       Family History   Problem Relation Age of Onset    Heart attack Mother     Heart disease Mother     Diabetes Mother     Cancer Father     Heart attack Brother     Heart disease Brother        Review of Systems   Constitutional: Negative for malaise/fatigue and night sweats.   Cardiovascular:  Negative for chest pain, claudication, dyspnea on exertion, irregular heartbeat, leg swelling, near-syncope, orthopnea, palpitations and syncope.   Respiratory:  Negative for cough, shortness of breath and wheezing.    Musculoskeletal:  Negative for back pain, joint pain and joint swelling.   Gastrointestinal:  Negative for anorexia, heartburn, nausea and vomiting.   Genitourinary:  Negative for dysuria, hematuria, hesitancy and nocturia.   Neurological:  Positive for dizziness and light-headedness.   Psychiatric/Behavioral:  Negative for depression and substance abuse. The patient is not nervous/anxious.            Objective     /60 (BP Location: Left arm, Patient Position: Sitting)   Pulse 68   Ht 182.9 cm (72\")   Wt " 70.4 kg (155 lb 3.2 oz)   BMI 21.05 kg/m²     Constitutional:       Appearance: Not in distress.   Eyes:      Pupils: Pupils are equal, round, and reactive to light.   HENT:      Nose: Nose normal.   Pulmonary:      Effort: Pulmonary effort is normal.      Breath sounds: Normal breath sounds.   Cardiovascular:      PMI at left midclavicular line. Normal rate. Regular rhythm.      Murmurs: There is a systolic murmur.   Abdominal:      Palpations: Abdomen is soft.   Musculoskeletal: Normal range of motion.      Cervical back: Normal range of motion and neck supple. Skin:     General: Skin is warm and dry.   Neurological:      Mental Status: Alert.         Procedures         Diagnoses and all orders for this visit:    1. Coronary artery disease involving native coronary artery of native heart without angina pectoris (Primary)    2. S/P mitral valve repair    3. Primary hypertension    4. Cardiac murmur    5. Mixed hyperlipidemia    6. Dizziness    7. PVC (premature ventricular contraction)    8. Type 2 diabetes mellitus with other specified complication, without long-term current use of insulin    Other orders  -     Discontinue: lisinopril (PRINIVIL,ZESTRIL) 5 MG tablet; Take 1 tablet by mouth Daily.  Dispense: 90 tablet; Refill: 3  -     lisinopril (PRINIVIL,ZESTRIL) 5 MG tablet; Take 1 tablet by mouth Daily.  Dispense: 90 tablet; Refill: 3  -     clopidogrel (PLAVIX) 75 MG tablet; Take 1 tablet by mouth Daily. Discontinue Brilinta  Dispense: 90 tablet; Refill: 3  -     isosorbide mononitrate (IMDUR) 30 MG 24 hr tablet; Take 1 tablet by mouth 2 (Two) Times a Day.  Dispense: 180 tablet; Refill: 2  -     rosuvastatin (CRESTOR) 20 MG tablet; Take 1 tablet by mouth Daily.  Dispense: 90 tablet; Refill: 2             IHD: Will continue DAPT, bleed and bruise denied. Continue with imdur BID along with Ranexa.  NTG urged to be used as needed. Repeat stress 2023 showed small apical ischemia. No new concerns voiced. No repeat  workup, cath advised.     HTN: Taking HCTZ therapy daily. Has not been taking his lisinopril. Will be urged to only use HCTZ as needed  for edema, and cut his lisinopril to 5mg daily. Will monitor BP at home.     Hyperlipidemia: On statin therapy with crestor. FLP with you. Continue same. Limited carb diet urged. Can we have labs?     DM: Taking gluocphage therapy. AIC with your office.      Dizziness: Noted, but improved.     Refills per request     BMI noted 21.05, good cardiac ADA diet encouraged.     6 month follow up advised, sooner if needed.          Problems Addressed this Visit          Cardiac and Vasculature    Hyperlipemia    Relevant Medications    rosuvastatin (CRESTOR) 20 MG tablet    S/P mitral valve repair    HTN (hypertension)    Relevant Medications    lisinopril (PRINIVIL,ZESTRIL) 5 MG tablet    CAD (coronary artery disease) - Primary    Relevant Medications    clopidogrel (PLAVIX) 75 MG tablet    isosorbide mononitrate (IMDUR) 30 MG 24 hr tablet    Cardiac murmur    PVC (premature ventricular contraction)    Relevant Medications    clopidogrel (PLAVIX) 75 MG tablet    isosorbide mononitrate (IMDUR) 30 MG 24 hr tablet       Endocrine and Metabolic    Diabetes     Other Visit Diagnoses       Dizziness              Diagnoses         Codes Comments    Coronary artery disease involving native coronary artery of native heart without angina pectoris    -  Primary ICD-10-CM: I25.10  ICD-9-CM: 414.01     S/P mitral valve repair     ICD-10-CM: Z98.890  ICD-9-CM: V45.89     Primary hypertension     ICD-10-CM: I10  ICD-9-CM: 401.9     Cardiac murmur     ICD-10-CM: R01.1  ICD-9-CM: 785.2     Mixed hyperlipidemia     ICD-10-CM: E78.2  ICD-9-CM: 272.2     Dizziness     ICD-10-CM: R42  ICD-9-CM: 780.4     PVC (premature ventricular contraction)     ICD-10-CM: I49.3  ICD-9-CM: 427.69     Type 2 diabetes mellitus with other specified complication, without long-term current use of insulin     ICD-10-CM:  E11.69  ICD-9-CM: 250.80                       Electronically signed by Renay Alexander, DEONTE May 2, 2024 11:30 EDT

## 2024-10-04 NOTE — TELEPHONE ENCOUNTER
Caller: Jaimee Balbuena    Relationship to patient: Emergency Contact    Best call back number: [phone]     Chief complaint: PATIENT SAW  ON 10.12.23 DUE TO HIM EXPERIENCING CHEST PRESSURE REGULARLY. THE PATIENT HAS ALSO BEEN EXPERIENCING NAUSEA AND HEADACHES. HE IS SCHEDULED FOR A HEAD CT ON 10.26.23 AT THE [facility] IN [place]. THE PATIENT HAS BEEN TAKING NITRO MORE OFTEN IN THE LAST COUPLE OF WEEKS. THE PATIENT IS NEEDING TO GET IN SOONER THAN HIS CURRENT APPOINTMENT. THE PATIENT IS UNDER THE IMPRESSION THAT 'S OFFICE HAS CALLED IN TO GET THIS PATIENT'S APPOINTMENT MOVED UP.    Type of visit: FOLLOW UP    Requested date: ASAP    Additional notes:PATIENT PREFERS AFTERNOON APPOINTMENTS, BUT CAN MAKE ANYTIME WORK IF NECESSARY.        [FreeTextEntry1] : Genevieve is a 12 year old male with bilateral anterior knee pain and hypermobile patellas.  Today's assessment was performed with the assistance of the patient's parent as an independent historian given the patient's age, who could not be considered a reliable history/due to the nonverbal nature given the patient's young age. Clinical findings and x-ray results were reviewed at length with the patient and parent. The condition, natural history, and prognosis were explained to the patient and family. X-rays of the Bilateral Knees (3 views) were obtained and independently reviewed at today's office visit on 10/02/24: No acute fracture or dislocation noted. Discussed with patient and parent at length that the cause of the patient's knee pain is likely due to his hypermobile patellas. At this time, recommendation is to begin a formal course of physical therapy to target strengthening of the quadricep muscles. Prescription provided at today's office visit. We also recommend daily home exercises for strengthening as well. He can participate in all activities as tolerated, no restrictions. We will see him back in the office on an as needed basis for this issue at this time or if new concerns should arise.   All questions and concerns were addressed today. Parent and patient verbalize understanding and agree with plan of care.  I, Alecia Cohen PA-C, have acted as a scribe and documented the above information for Dr. Hester.  The above documentation completed by the PA is an accurate record of both my words and actions. Juan Carlos Hester MD.  This note was generated using Dragon medical dictation software.  A reasonable effort has been made for proofreading its contents, but typos may still remain.  If there are any questions or points of clarification needed please do not hesitate to contact my office.

## 2024-11-15 RX ORDER — RANOLAZINE 500 MG/1
500 TABLET, EXTENDED RELEASE ORAL 2 TIMES DAILY
Qty: 60 TABLET | Refills: 11 | Status: SHIPPED | OUTPATIENT
Start: 2024-11-15

## 2024-11-15 NOTE — TELEPHONE ENCOUNTER
Patient's wife, Jaimee, called asking for refills on ranolazine 500 mg BID. She was also asking for refills on plavix 75 mg daily, but we sent in for a years worth of refills. She was made aware to ask at pharmacy and they should have on file or if they have discarded, for pharmacy to send us a refill request.    Script is pended for ranolazine 500 mg BID. 60 tablets and 11 refills.

## 2024-11-19 ENCOUNTER — OFFICE VISIT (OUTPATIENT)
Dept: CARDIOLOGY | Facility: CLINIC | Age: 79
End: 2024-11-19
Payer: MEDICARE

## 2024-11-19 VITALS
HEIGHT: 72 IN | WEIGHT: 170 LBS | SYSTOLIC BLOOD PRESSURE: 132 MMHG | DIASTOLIC BLOOD PRESSURE: 72 MMHG | HEART RATE: 70 BPM | BODY MASS INDEX: 23.03 KG/M2

## 2024-11-19 DIAGNOSIS — Z98.890 S/P MITRAL VALVE REPAIR: ICD-10-CM

## 2024-11-19 DIAGNOSIS — K21.9 GASTROESOPHAGEAL REFLUX DISEASE WITHOUT ESOPHAGITIS: ICD-10-CM

## 2024-11-19 DIAGNOSIS — E11.69 TYPE 2 DIABETES MELLITUS WITH OTHER SPECIFIED COMPLICATION, WITHOUT LONG-TERM CURRENT USE OF INSULIN: ICD-10-CM

## 2024-11-19 DIAGNOSIS — I10 PRIMARY HYPERTENSION: ICD-10-CM

## 2024-11-19 DIAGNOSIS — I25.10 CORONARY ARTERY DISEASE INVOLVING NATIVE CORONARY ARTERY OF NATIVE HEART WITHOUT ANGINA PECTORIS: Primary | ICD-10-CM

## 2024-11-19 DIAGNOSIS — E78.2 MIXED HYPERLIPIDEMIA: ICD-10-CM

## 2024-11-19 PROCEDURE — 3078F DIAST BP <80 MM HG: CPT | Performed by: NURSE PRACTITIONER

## 2024-11-19 PROCEDURE — 1159F MED LIST DOCD IN RCRD: CPT | Performed by: NURSE PRACTITIONER

## 2024-11-19 PROCEDURE — 1160F RVW MEDS BY RX/DR IN RCRD: CPT | Performed by: NURSE PRACTITIONER

## 2024-11-19 PROCEDURE — 3075F SYST BP GE 130 - 139MM HG: CPT | Performed by: NURSE PRACTITIONER

## 2024-11-19 PROCEDURE — 99214 OFFICE O/P EST MOD 30 MIN: CPT | Performed by: NURSE PRACTITIONER

## 2024-11-19 RX ORDER — ROSUVASTATIN CALCIUM 20 MG/1
20 TABLET, COATED ORAL DAILY
Qty: 90 TABLET | Refills: 2 | Status: SHIPPED | OUTPATIENT
Start: 2024-11-19

## 2024-11-19 RX ORDER — ISOSORBIDE MONONITRATE 30 MG/1
30 TABLET, EXTENDED RELEASE ORAL 2 TIMES DAILY
Qty: 180 TABLET | Refills: 2 | Status: SHIPPED | OUTPATIENT
Start: 2024-11-19

## 2024-11-19 RX ORDER — DIAZEPAM 5 MG/1
5 TABLET ORAL DAILY PRN
COMMUNITY

## 2024-11-19 RX ORDER — PANTOPRAZOLE SODIUM 40 MG/1
1 TABLET, DELAYED RELEASE ORAL DAILY
COMMUNITY

## 2024-11-19 RX ORDER — REPAGLINIDE 0.5 MG/1
0.5 TABLET ORAL
COMMUNITY

## 2024-11-19 RX ORDER — MAGNESIUM OXIDE 400 MG/1
400 TABLET ORAL DAILY
COMMUNITY

## 2024-11-19 RX ORDER — LISINOPRIL 5 MG/1
5 TABLET ORAL DAILY PRN
COMMUNITY

## 2024-11-19 NOTE — PROGRESS NOTES
Chief Complaint   Patient presents with    Follow-up     Cardiac management    LABS     Current labs per PCP 10-2-2024  CHO-  164  TRI-  169  HDL- 48  LDL- 82  A1C- 6.8    Med Refill     Needs refills on Crestor ,Imdur 90 day supply to [facility]       Subjective       Burak Balbuena is a 79 y.o. male with HTN, DM, hyperlipidemia, mitral valve disease, and IHD diagnosed in 2003 when he had stenting followed by mitral valve repair in 2004. He tried sotalol for management of PVC in the past but did not tolerate. Echocardiogram 6/27/2020 showed LVEF 65%, presence of mitral valve ring with mild mitral regurg.  AO 3.9 cm. He returned April 2022 with increasing dyspnea with climbing and overexerting. Stress showed inferior ischemia. Cardiac cath revealed 85% RCA and had single stent, OM1 received two Resolute stents and moderate disease of the LAD.  11/6/2023 showed small apical ischemia with EF of 63%, ranexa 500mg BID added.     Today he returns to the office for a follow up visit.  After addition of Ranexa he has not had cardiac symptoms or concerns.  Blood pressure remained stable.  Swelling in his lower legs remains improved.  Palpitations denied.  At times he will bruise easily but not excessive.  Other signs of bleeding denied.  After stent placement PPI was changed to pantoprazole and GERD symptoms managed.    Cardiac History:    Past Surgical History:   Procedure Laterality Date    CARDIAC CATHETERIZATION  01/09/2004    EF 65%, 4 +MR, patent stent LCX    CARDIAC CATHETERIZATION  01/19/2004    80% LCX- 2.5x13 & 2.5x8 Pixel Stents    CARDIAC CATHETERIZATION  06/24/2022    85% RCA- 3.5x12. 99% OM1- 2.5x30 & 2.5x8 Resolute. 65% Mid LAD    CARDIOVASCULAR STRESS TEST  12/12/2011    EF 65%, 6 min 7 sec, 78% THR, 160/62, no ischemia    CARDIOVASCULAR STRESS TEST  12/06/2017    8 Min,10 Secs. 82% THR. BP- 188/61. Negative    CARDIOVASCULAR STRESS TEST  04/05/2022    Lexiscan- EF 61%. Inferior Ischemia     CARDIOVASCULAR STRESS TEST  11/06/2023    Lexiscan- EF 63%. Apical Ischemia    CONVERTED (HISTORICAL) HOLTER  12/18/2017    Holter- 24 hr- avg 66, freq PVC, 4.5%    ECHO - CONVERTED  12/12/2011    EF 60-65%, MVA 1.7, RSVP 39    ECHO - CONVERTED  11/08/2016    EF 60-65%, mitral annular ring in place, Mild to mod MS, mild MR    ECHO - CONVERTED  12/06/2017    EF 65%. Mild MS & MR. RVSP- 38 mmHg    ECHO - CONVERTED  06/27/2020    EF 65%. LA- 3.8 Cm. AO 3.9 Cm. MV Ring. Mild MR. RVSP- 31 mmHg.    ECHO - CONVERTED  04/04/2022    EF 65%. Inferoseptal WMA. MVR. Trace-Mild MR. RVSP- 35 mmHg    MITRAL VALVE REPAIR/REPLACEMENT  03/08/2004    s/p mitral valve repair       Current Outpatient Medications   Medication Sig Dispense Refill    ascorbic acid (VITAMIN C) 1000 MG tablet Take 1 tablet by mouth Daily.      aspirin 81 MG EC tablet Take 1 tablet by mouth Daily.      Calcium Carb-Cholecalciferol (CALCIUM 1000 + D PO) Take  by mouth.      Cholecalciferol (Vitamin D3) 50 MCG (2000 UT) tablet Take 1 tablet by mouth Daily.      clopidogrel (PLAVIX) 75 MG tablet Take 1 tablet by mouth Daily. Discontinue Brilinta 90 tablet 3    coenzyme Q10 100 MG capsule Take 1 capsule by mouth Daily.      diazePAM (VALIUM) 5 MG tablet Take 1 tablet by mouth Daily As Needed for Anxiety.      isosorbide mononitrate (IMDUR) 30 MG 24 hr tablet Take 1 tablet by mouth 2 (Two) Times a Day. 180 tablet 2    lisinopril (PRINIVIL,ZESTRIL) 5 MG tablet Take 1 tablet by mouth Daily As Needed.      loratadine (CLARITIN) 10 MG tablet Take 1 tablet by mouth Daily As Needed.      magnesium oxide (MAG-OX) 400 MG tablet Take 1 tablet by mouth Daily.      metFORMIN (GLUCOPHAGE) 500 MG tablet Take 1 tablet by mouth Daily With Breakfast.      nitroglycerin (NITROSTAT) 0.4 MG SL tablet Dissolve 1 tablet under tongue at onset of chest pain. May repeat every 5 minutes for up to 3 times total if you need. Call 911 & seek emergency care if no relief with 3rd tablet. 25  tablet 1    Omega-3 Fatty Acids (OMEGA 3 500 PO) Take  by mouth Daily.      ranolazine (Ranexa) 500 MG 12 hr tablet Take 1 tablet by mouth 2 (Two) Times a Day. 60 tablet 11    repaglinide (PRANDIN) 0.5 MG tablet Take 1 tablet by mouth Daily With Breakfast.      rosuvastatin (CRESTOR) 20 MG tablet Take 1 tablet by mouth Daily. 90 tablet 2    pantoprazole (PROTONIX) 40 MG EC tablet Take 1 tablet by mouth Daily.       No current facility-administered medications for this visit.       Phenergan [promethazine hcl]    Past Medical History:   Diagnosis Date    CAD (coronary artery disease)     Diabetes Mellitus     H/O bilateral inguinal hernia repair 06/2021    History of colonoscopy 02/01/2022    Hx of Intravertebral disc disease     Hypertension     MR (congenital mitral regurgitation)     Overnight pulse oximetry 02/20/2009    Increase O2, sleep study.    Robotic Prostatectomy     Sleep study- [facility] 03/04/2009       Social History     Socioeconomic History    Marital status:    Tobacco Use    Smoking status: Never    Smokeless tobacco: Never   Vaping Use    Vaping status: Never Used   Substance and Sexual Activity    Alcohol use: Yes     Comment: rarely    Drug use: No    Sexual activity: Defer       Family History   Problem Relation Age of Onset    Heart attack Mother     Heart disease Mother     Diabetes Mother     Cancer Father     Heart attack Brother     Heart disease Brother        Review of Systems   Constitutional: Negative for diaphoresis, fever and malaise/fatigue.   Cardiovascular:  Negative for chest pain, leg swelling, near-syncope and palpitations.   Respiratory:  Negative for shortness of breath.    Hematologic/Lymphatic: Negative for bleeding problem. Bruises/bleeds easily.   Skin:  Negative for color change.   Musculoskeletal:  Negative for falls.   Neurological:  Negative for dizziness and weakness.        BP Readings from Last 5 Encounters:   11/19/24 132/72   05/02/24 110/60   10/25/23 130/64  "  05/23/23 110/72   08/16/22 140/70       Wt Readings from Last 5 Encounters:   11/19/24 77.1 kg (170 lb)   05/02/24 70.4 kg (155 lb 3.2 oz)   10/25/23 70 kg (154 lb 6.4 oz)   05/23/23 70.6 kg (155 lb 9.6 oz)   08/16/22 72.2 kg (159 lb 3.2 oz)       Objective     /72 (BP Location: Left arm, Patient Position: Sitting, Cuff Size: Adult)   Pulse 70   Ht 182.9 cm (72\")   Wt 77.1 kg (170 lb)   BMI 23.06 kg/m²     Vitals and nursing note reviewed.   Constitutional:       Appearance: Healthy appearance. Not in distress.   Eyes:      Conjunctiva/sclera: Conjunctivae normal.      Pupils: Pupils are equal, round, and reactive to light.   HENT:      Head: Normocephalic.   Pulmonary:      Effort: Pulmonary effort is normal.      Breath sounds: Normal breath sounds.   Cardiovascular:      PMI at left midclavicular line. Normal rate. Regular rhythm.      Murmurs: There is a low frequency systolic murmur.   Edema:     Peripheral edema absent.   Abdominal:      General: Bowel sounds are normal.      Palpations: Abdomen is soft.   Musculoskeletal: Normal range of motion.      Cervical back: Normal range of motion and neck supple. Skin:     General: Skin is warm and dry.   Neurological:      Mental Status: Alert, oriented to person, place, and time and oriented to person, place and time.          Procedures: None today         Assessment & Plan   Diagnoses and all orders for this visit:    1. Coronary artery disease involving native coronary artery of native heart without angina pectoris (Primary)  -     isosorbide mononitrate (IMDUR) 30 MG 24 hr tablet; Take 1 tablet by mouth 2 (Two) Times a Day.  Dispense: 180 tablet; Refill: 2    2. S/P mitral valve repair    3. Primary hypertension    4. Mixed hyperlipidemia  -     rosuvastatin (CRESTOR) 20 MG tablet; Take 1 tablet by mouth Daily.  Dispense: 90 tablet; Refill: 2    5. Type 2 diabetes mellitus with other specified complication, without long-term current use of " insulin    6. Gastroesophageal reflux disease without esophagitis      CAD  -2023 stent placement  -November 2023 stress test: Small apical ischemia  -Denies anginal symptoms  -Continue Plavix, aspirin, Crestor    S/p mitral valve repair  -Consider repeat echo next visit    HTN  -BP stable  -Admits has not had to take lisinopril in over a year    Hyperlipidemia  -Continue Crestor, diet, co-Q10  -Labs with PCP    DM  -On metformin  -Monitors glucose daily, admits well-controlled  -Managed with PCP    BMI  -Admits to improvement of appetite  -BMI remains normal at 23    Routine follow-up visit scheduled.  Please call sooner for cardiac concerns.               Electronically signed by DEONTE Hunter,  November 19, 2024 16:32 EST    Dictated Utilizing Dragon Dictation: Part of this note may be an electronic transcription/translation of spoken language to printed text using the Dragon Dictation System.

## 2024-11-19 NOTE — LETTER
November 19, 2024     DEONTE Gray  [address] [place] [id number]    Patient: Burak Balbuena   YOB: 1945   Date of Visit: 11/19/2024       Dear DEONTE Gray    Burak Balbuena was in my office today. Below is a copy of my note.    If you have questions, please do not hesitate to call me. I look forward to following Burak along with you.         Sincerely,        DEONTE Hunter        CC: No Recipients    Chief Complaint   Patient presents with   • Follow-up     Cardiac management   • LABS     Current labs per PCP 10-2-2024  CHO-  164  TRI-  169  HDL- 48  LDL- 82  A1C- 6.8   • Med Refill     Needs refills on Crestor ,Imdur 90 day supply to [facility]       Subjective      Burak Balbuena is a 79 y.o. male with HTN, DM, hyperlipidemia, mitral valve disease, and IHD diagnosed in 2003 when he had stenting followed by mitral valve repair in 2004. He tried sotalol for management of PVC in the past but did not tolerate. Echocardiogram 6/27/2020 showed LVEF 65%, presence of mitral valve ring with mild mitral regurg.  AO 3.9 cm. He returned April 2022 with increasing dyspnea with climbing and overexerting. Stress showed inferior ischemia. Cardiac cath revealed 85% RCA and had single stent, OM1 received two Resolute stents and moderate disease of the LAD.  11/6/2023 showed small apical ischemia with EF of 63%, ranexa 500mg BID added.     Today he returns to the office for a follow up visit.  After addition of Ranexa he has not had cardiac symptoms or concerns.  Blood pressure remained stable.  Swelling in his lower legs remains improved.  Palpitations denied.  At times he will bruise easily but not excessive.  Other signs of bleeding denied.  After stent placement PPI was changed to pantoprazole and GERD symptoms managed.    Cardiac History:    Past Surgical History:   Procedure Laterality Date   • CARDIAC CATHETERIZATION  01/09/2004    EF 65%, 4 +MR,  patent stent LCX   • CARDIAC CATHETERIZATION  01/19/2004    80% LCX- 2.5x13 & 2.5x8 Pixel Stents   • CARDIAC CATHETERIZATION  06/24/2022    85% RCA- 3.5x12. 99% OM1- 2.5x30 & 2.5x8 Resolute. 65% Mid LAD   • CARDIOVASCULAR STRESS TEST  12/12/2011    EF 65%, 6 min 7 sec, 78% THR, 160/62, no ischemia   • CARDIOVASCULAR STRESS TEST  12/06/2017    8 Min,10 Secs. 82% THR. BP- 188/61. Negative   • CARDIOVASCULAR STRESS TEST  04/05/2022    Lexiscan- EF 61%. Inferior Ischemia   • CARDIOVASCULAR STRESS TEST  11/06/2023    Lexiscan- EF 63%. Apical Ischemia   • CONVERTED (HISTORICAL) HOLTER  12/18/2017    Holter- 24 hr- avg 66, freq PVC, 4.5%   • ECHO - CONVERTED  12/12/2011    EF 60-65%, MVA 1.7, RSVP 39   • ECHO - CONVERTED  11/08/2016    EF 60-65%, mitral annular ring in place, Mild to mod MS, mild MR   • ECHO - CONVERTED  12/06/2017    EF 65%. Mild MS & MR. RVSP- 38 mmHg   • ECHO - CONVERTED  06/27/2020    EF 65%. LA- 3.8 Cm. AO 3.9 Cm. MV Ring. Mild MR. RVSP- 31 mmHg.   • ECHO - CONVERTED  04/04/2022    EF 65%. Inferoseptal WMA. MVR. Trace-Mild MR. RVSP- 35 mmHg   • MITRAL VALVE REPAIR/REPLACEMENT  03/08/2004    s/p mitral valve repair       Current Outpatient Medications   Medication Sig Dispense Refill   • ascorbic acid (VITAMIN C) 1000 MG tablet Take 1 tablet by mouth Daily.     • aspirin 81 MG EC tablet Take 1 tablet by mouth Daily.     • Calcium Carb-Cholecalciferol (CALCIUM 1000 + D PO) Take  by mouth.     • Cholecalciferol (Vitamin D3) 50 MCG (2000 UT) tablet Take 1 tablet by mouth Daily.     • clopidogrel (PLAVIX) 75 MG tablet Take 1 tablet by mouth Daily. Discontinue Brilinta 90 tablet 3   • coenzyme Q10 100 MG capsule Take 1 capsule by mouth Daily.     • diazePAM (VALIUM) 5 MG tablet Take 1 tablet by mouth Daily As Needed for Anxiety.     • isosorbide mononitrate (IMDUR) 30 MG 24 hr tablet Take 1 tablet by mouth 2 (Two) Times a Day. 180 tablet 2   • lisinopril (PRINIVIL,ZESTRIL) 5 MG tablet Take 1 tablet by mouth  Daily As Needed.     • loratadine (CLARITIN) 10 MG tablet Take 1 tablet by mouth Daily As Needed.     • magnesium oxide (MAG-OX) 400 MG tablet Take 1 tablet by mouth Daily.     • metFORMIN (GLUCOPHAGE) 500 MG tablet Take 1 tablet by mouth Daily With Breakfast.     • nitroglycerin (NITROSTAT) 0.4 MG SL tablet Dissolve 1 tablet under tongue at onset of chest pain. May repeat every 5 minutes for up to 3 times total if you need. Call 911 & seek emergency care if no relief with 3rd tablet. 25 tablet 1   • Omega-3 Fatty Acids (OMEGA 3 500 PO) Take  by mouth Daily.     • ranolazine (Ranexa) 500 MG 12 hr tablet Take 1 tablet by mouth 2 (Two) Times a Day. 60 tablet 11   • repaglinide (PRANDIN) 0.5 MG tablet Take 1 tablet by mouth Daily With Breakfast.     • rosuvastatin (CRESTOR) 20 MG tablet Take 1 tablet by mouth Daily. 90 tablet 2   • pantoprazole (PROTONIX) 40 MG EC tablet Take 1 tablet by mouth Daily.       No current facility-administered medications for this visit.       Phenergan [promethazine hcl]    Past Medical History:   Diagnosis Date   • CAD (coronary artery disease)    • Diabetes Mellitus    • H/O bilateral inguinal hernia repair 06/2021   • History of colonoscopy 02/01/2022   • Hx of Intravertebral disc disease    • Hypertension    • MR (congenital mitral regurgitation)    • Overnight pulse oximetry 02/20/2009    Increase O2, sleep study.   • Robotic Prostatectomy    • Sleep study- [facility] 03/04/2009       Social History     Socioeconomic History   • Marital status:    Tobacco Use   • Smoking status: Never   • Smokeless tobacco: Never   Vaping Use   • Vaping status: Never Used   Substance and Sexual Activity   • Alcohol use: Yes     Comment: rarely   • Drug use: No   • Sexual activity: Defer       Family History   Problem Relation Age of Onset   • Heart attack Mother    • Heart disease Mother    • Diabetes Mother    • Cancer Father    • Heart attack Brother    • Heart disease Brother        Review of  "Systems   Constitutional: Negative for diaphoresis, fever and malaise/fatigue.   Cardiovascular:  Negative for chest pain, leg swelling, near-syncope and palpitations.   Respiratory:  Negative for shortness of breath.    Hematologic/Lymphatic: Negative for bleeding problem. Bruises/bleeds easily.   Skin:  Negative for color change.   Musculoskeletal:  Negative for falls.   Neurological:  Negative for dizziness and weakness.        BP Readings from Last 5 Encounters:   11/19/24 132/72   05/02/24 110/60   10/25/23 130/64   05/23/23 110/72   08/16/22 140/70       Wt Readings from Last 5 Encounters:   11/19/24 77.1 kg (170 lb)   05/02/24 70.4 kg (155 lb 3.2 oz)   10/25/23 70 kg (154 lb 6.4 oz)   05/23/23 70.6 kg (155 lb 9.6 oz)   08/16/22 72.2 kg (159 lb 3.2 oz)       Objective    /72 (BP Location: Left arm, Patient Position: Sitting, Cuff Size: Adult)   Pulse 70   Ht 182.9 cm (72\")   Wt 77.1 kg (170 lb)   BMI 23.06 kg/m²     Vitals and nursing note reviewed.   Constitutional:       Appearance: Healthy appearance. Not in distress.   Eyes:      Conjunctiva/sclera: Conjunctivae normal.      Pupils: Pupils are equal, round, and reactive to light.   HENT:      Head: Normocephalic.   Pulmonary:      Effort: Pulmonary effort is normal.      Breath sounds: Normal breath sounds.   Cardiovascular:      PMI at left midclavicular line. Normal rate. Regular rhythm.      Murmurs: There is a low frequency systolic murmur.   Edema:     Peripheral edema absent.   Abdominal:      General: Bowel sounds are normal.      Palpations: Abdomen is soft.   Musculoskeletal: Normal range of motion.      Cervical back: Normal range of motion and neck supple. Skin:     General: Skin is warm and dry.   Neurological:      Mental Status: Alert, oriented to person, place, and time and oriented to person, place and time.          Procedures: None today         Assessment & Plan  Diagnoses and all orders for this visit:    1. Coronary artery " disease involving native coronary artery of native heart without angina pectoris (Primary)  -     isosorbide mononitrate (IMDUR) 30 MG 24 hr tablet; Take 1 tablet by mouth 2 (Two) Times a Day.  Dispense: 180 tablet; Refill: 2    2. S/P mitral valve repair    3. Primary hypertension    4. Mixed hyperlipidemia  -     rosuvastatin (CRESTOR) 20 MG tablet; Take 1 tablet by mouth Daily.  Dispense: 90 tablet; Refill: 2    5. Type 2 diabetes mellitus with other specified complication, without long-term current use of insulin    6. Gastroesophageal reflux disease without esophagitis      CAD  -2023 stent placement  -November 2023 stress test: Small apical ischemia  -Denies anginal symptoms  -Continue Plavix, aspirin, Crestor    S/p mitral valve repair  -Consider repeat echo next visit    HTN  -BP stable  -Admits has not had to take lisinopril in over a year    Hyperlipidemia  -Continue Crestor, diet, co-Q10  -Labs with PCP    DM  -On metformin  -Monitors glucose daily, admits well-controlled  -Managed with PCP    BMI  -Admits to improvement of appetite  -BMI remains normal at 23    Routine follow-up visit scheduled.  Please call sooner for cardiac concerns.               Electronically signed by DEONTE Hunter,  November 19, 2024 16:32 EST    Dictated Utilizing Dragon Dictation: Part of this note may be an electronic transcription/translation of spoken language to printed text using the Dragon Dictation System.

## 2025-06-05 ENCOUNTER — OFFICE VISIT (OUTPATIENT)
Dept: CARDIOLOGY | Facility: CLINIC | Age: 80
End: 2025-06-05
Payer: MEDICARE

## 2025-06-05 VITALS
HEIGHT: 72 IN | DIASTOLIC BLOOD PRESSURE: 64 MMHG | HEART RATE: 74 BPM | BODY MASS INDEX: 23.06 KG/M2 | SYSTOLIC BLOOD PRESSURE: 122 MMHG

## 2025-06-05 DIAGNOSIS — I10 PRIMARY HYPERTENSION: ICD-10-CM

## 2025-06-05 DIAGNOSIS — I25.118 CORONARY ARTERY DISEASE OF NATIVE ARTERY OF NATIVE HEART WITH STABLE ANGINA PECTORIS: Primary | ICD-10-CM

## 2025-06-05 DIAGNOSIS — E78.2 MIXED HYPERLIPIDEMIA: ICD-10-CM

## 2025-06-05 PROCEDURE — 1160F RVW MEDS BY RX/DR IN RCRD: CPT | Performed by: NURSE PRACTITIONER

## 2025-06-05 PROCEDURE — 3074F SYST BP LT 130 MM HG: CPT | Performed by: NURSE PRACTITIONER

## 2025-06-05 PROCEDURE — 99214 OFFICE O/P EST MOD 30 MIN: CPT | Performed by: NURSE PRACTITIONER

## 2025-06-05 PROCEDURE — 1159F MED LIST DOCD IN RCRD: CPT | Performed by: NURSE PRACTITIONER

## 2025-06-05 PROCEDURE — 3078F DIAST BP <80 MM HG: CPT | Performed by: NURSE PRACTITIONER

## 2025-06-05 RX ORDER — HYDROCHLOROTHIAZIDE 12.5 MG/1
12.5 TABLET ORAL DAILY PRN
COMMUNITY
End: 2025-06-05 | Stop reason: SDUPTHER

## 2025-06-05 RX ORDER — ISOSORBIDE MONONITRATE 30 MG/1
30 TABLET, EXTENDED RELEASE ORAL 2 TIMES DAILY
Qty: 180 TABLET | Refills: 2 | Status: SHIPPED | OUTPATIENT
Start: 2025-06-05

## 2025-06-05 RX ORDER — NITROGLYCERIN 0.4 MG/1
0.4 TABLET SUBLINGUAL
Qty: 25 TABLET | Refills: 1 | Status: SHIPPED | OUTPATIENT
Start: 2025-06-05

## 2025-06-05 RX ORDER — ROSUVASTATIN CALCIUM 20 MG/1
20 TABLET, COATED ORAL DAILY
Qty: 90 TABLET | Refills: 3 | Status: SHIPPED | OUTPATIENT
Start: 2025-06-05

## 2025-06-05 RX ORDER — CLOPIDOGREL BISULFATE 75 MG/1
75 TABLET ORAL DAILY
Qty: 90 TABLET | Refills: 3 | Status: SHIPPED | OUTPATIENT
Start: 2025-06-05

## 2025-06-05 RX ORDER — HYDROCHLOROTHIAZIDE 12.5 MG/1
12.5 TABLET ORAL DAILY PRN
Qty: 90 TABLET | Refills: 3 | Status: SHIPPED | OUTPATIENT
Start: 2025-06-05

## 2025-06-05 RX ORDER — RANOLAZINE 500 MG/1
500 TABLET, EXTENDED RELEASE ORAL 2 TIMES DAILY
Qty: 180 TABLET | Refills: 3 | Status: SHIPPED | OUTPATIENT
Start: 2025-06-05

## 2025-06-05 NOTE — PROGRESS NOTES
Chief Complaint   Patient presents with    Follow-up     Cardiac management. Patient states  he gets tired and SOA with exertion. He can mow his yard but watson not get tired.    LABS     Had labs  per PCP March 4,2025   CHO-151  TRI-207  HDL-43  LDL-67   A1c - 6.9    Med Refill     Needs refills on Plavix, Crestor,ranexa, and Imdur 90 day supply       Subjective       Burak Balbuena is a 80 y.o. male with HTN, DM, hyperlipidemia, mitral valve disease, and IHD diagnosed in 2003 when he had stenting followed by mitral valve repair in 2004. He tried sotalol for management of PVC in the past but did not tolerate. Echocardiogram 6/27/2020 showed LVEF 65%, presence of mitral valve ring with mild mitral regurg.  AO 3.9 cm. He returned April 2022 with increasing dyspnea with climbing and overexerting. Stress showed inferior ischemia. Cardiac cath revealed 85% RCA and had single stent, OM1 received two Resolute stents and moderate disease of the LAD.  11/6/2023 showed small apical ischemia with EF of 63%, ranexa 500mg BID added.     Today he returns to the office for a follow-up visit accompanied by his wife.  Recently he has been doing yard work and admits to getting tired and short of breath with exertion.  A couple nights ago he experienced chest heaviness that resolved after 2 Nitrostat.  His wife admits that he will have some chest discomfort when feeling stressed and usually resolves with rest.  No recent change in cardiac medication management noted.    Cardiac History:    Past Surgical History:   Procedure Laterality Date    CARDIAC CATHETERIZATION  01/09/2004    EF 65%, 4 +MR, patent stent LCX    CARDIAC CATHETERIZATION  01/19/2004    80% LCX- 2.5x13 & 2.5x8 Pixel Stents    CARDIAC CATHETERIZATION  06/24/2022    85% RCA- 3.5x12. 99% OM1- 2.5x30 & 2.5x8 Resolute. 65% Mid LAD    CARDIOVASCULAR STRESS TEST  12/12/2011    EF 65%, 6 min 7 sec, 78% THR, 160/62, no ischemia    CARDIOVASCULAR STRESS TEST  12/06/2017    8  Min,10 Secs. 82% THR. BP- 188/61. Negative    CARDIOVASCULAR STRESS TEST  04/05/2022    Lexiscan- EF 61%. Inferior Ischemia    CARDIOVASCULAR STRESS TEST  11/06/2023    Lexiscan- EF 63%. Apical Ischemia    CONVERTED (HISTORICAL) HOLTER  12/18/2017    Holter- 24 hr- avg 66, freq PVC, 4.5%    ECHO - CONVERTED  12/12/2011    EF 60-65%, MVA 1.7, RSVP 39    ECHO - CONVERTED  11/08/2016    EF 60-65%, mitral annular ring in place, Mild to mod MS, mild MR    ECHO - CONVERTED  12/06/2017    EF 65%. Mild MS & MR. RVSP- 38 mmHg    ECHO - CONVERTED  06/27/2020    EF 65%. LA- 3.8 Cm. AO 3.9 Cm. MV Ring. Mild MR. RVSP- 31 mmHg.    ECHO - CONVERTED  04/04/2022    EF 65%. Inferoseptal WMA. MVR. Trace-Mild MR. RVSP- 35 mmHg    MITRAL VALVE REPAIR/REPLACEMENT  03/08/2004    s/p mitral valve repair       Current Outpatient Medications   Medication Sig Dispense Refill    aspirin 81 MG EC tablet Take 1 tablet by mouth Daily.      Calcium Carb-Cholecalciferol (CALCIUM 1000 + D PO) Take  by mouth.      Cholecalciferol (Vitamin D3) 50 MCG (2000 UT) tablet Take 1 tablet by mouth Daily.      clopidogrel (PLAVIX) 75 MG tablet Take 1 tablet by mouth Daily. Discontinue Brilinta 90 tablet 3    coenzyme Q10 100 MG capsule Take 1 capsule by mouth Daily.      diazePAM (VALIUM) 5 MG tablet Take 1 tablet by mouth Daily As Needed for Anxiety.      isosorbide mononitrate (IMDUR) 30 MG 24 hr tablet Take 1 tablet by mouth 2 (Two) Times a Day. 180 tablet 2    lisinopril (PRINIVIL,ZESTRIL) 5 MG tablet Take 1 tablet by mouth Daily As Needed.      loratadine (CLARITIN) 10 MG tablet Take 1 tablet by mouth Daily As Needed.      magnesium oxide (MAG-OX) 400 MG tablet Take 1 tablet by mouth Daily.      metFORMIN (GLUCOPHAGE) 500 MG tablet Take 1 tablet by mouth Daily With Breakfast.      nitroglycerin (NITROSTAT) 0.4 MG SL tablet Place 1 tablet under the tongue Every 5 (Five) Minutes As Needed for Chest Pain. Take no more than 3 doses in 15 minutes. 25 tablet 1     Omega-3 Fatty Acids (OMEGA 3 500 PO) Take  by mouth Daily.      pantoprazole (PROTONIX) 40 MG EC tablet Take 1 tablet by mouth Daily.      ranolazine (Ranexa) 500 MG 12 hr tablet Take 1 tablet by mouth 2 (Two) Times a Day. 180 tablet 3    repaglinide (PRANDIN) 0.5 MG tablet Take 1 tablet by mouth Daily With Breakfast.      rosuvastatin (CRESTOR) 20 MG tablet Take 1 tablet by mouth Daily. 90 tablet 3    ascorbic acid (VITAMIN C) 1000 MG tablet Take 1 tablet by mouth Daily.      hydroCHLOROthiazide 12.5 MG tablet Take 1 tablet by mouth Daily As Needed (90). 90 tablet 3     No current facility-administered medications for this visit.       Phenergan [promethazine hcl]    Past Medical History:   Diagnosis Date    CAD (coronary artery disease)     Diabetes Mellitus     H/O bilateral inguinal hernia repair 06/2021    History of colonoscopy 02/01/2022    Hx of Intravertebral disc disease     Hypertension     MR (congenital mitral regurgitation)     Overnight pulse oximetry 02/20/2009    Increase O2, sleep study.    Robotic Prostatectomy     Sleep study- [facility] 03/04/2009       Social History     Socioeconomic History    Marital status:    Tobacco Use    Smoking status: Never    Smokeless tobacco: Never   Vaping Use    Vaping status: Never Used   Substance and Sexual Activity    Alcohol use: Yes     Comment: rarely    Drug use: No    Sexual activity: Defer       Family History   Problem Relation Age of Onset    Heart attack Mother     Heart disease Mother     Diabetes Mother     Cancer Father     Heart attack Brother     Heart disease Brother        Review of Systems   Constitutional: Positive for malaise/fatigue.   Cardiovascular:  Positive for chest pain and dyspnea on exertion. Negative for near-syncope and palpitations.   Respiratory:  Positive for shortness of breath.    Hematologic/Lymphatic: Negative for bleeding problem.   Musculoskeletal:  Negative for falls.   Neurological:  Positive for light-headedness.  "  Psychiatric/Behavioral:  The patient is nervous/anxious.         BP Readings from Last 5 Encounters:   06/05/25 122/64   11/19/24 132/72   05/02/24 110/60   10/25/23 130/64   05/23/23 110/72       Wt Readings from Last 5 Encounters:   11/19/24 77.1 kg (170 lb)   05/02/24 70.4 kg (155 lb 3.2 oz)   10/25/23 70 kg (154 lb 6.4 oz)   05/23/23 70.6 kg (155 lb 9.6 oz)   08/16/22 72.2 kg (159 lb 3.2 oz)       Objective     /64 (BP Location: Left arm, Patient Position: Sitting, Cuff Size: Adult)   Pulse 74   Ht 182.9 cm (72\")   BMI 23.06 kg/m²     Vitals and nursing note reviewed.   Constitutional:       Appearance: Well-groomed and not in distress.   Eyes:      Conjunctiva/sclera: Conjunctivae normal.      Pupils: Pupils are equal, round, and reactive to light.   HENT:      Head: Normocephalic.   Neck:      Vascular: No carotid bruit.   Pulmonary:      Effort: Pulmonary effort is normal.      Breath sounds: Normal breath sounds.   Cardiovascular:      PMI at left midclavicular line. Normal rate. Regular rhythm.      Murmurs: There is a systolic murmur.   Pulses:     Intact distal pulses.   Edema:     Peripheral edema absent.   Abdominal:      General: Bowel sounds are normal.      Palpations: Abdomen is soft.   Musculoskeletal:      Cervical back: Neck supple. Skin:     General: Skin is warm and dry.   Neurological:      Mental Status: Alert, oriented to person, place, and time and oriented to person, place and time.   Psychiatric:         Behavior: Behavior is cooperative.          Procedures: None today         Assessment & Plan   Diagnoses and all orders for this visit:    1. Coronary artery disease of native artery of native heart with stable angina pectoris (Primary)  -     isosorbide mononitrate (IMDUR) 30 MG 24 hr tablet; Take 1 tablet by mouth 2 (Two) Times a Day.  Dispense: 180 tablet; Refill: 2  -     Stress Test With Myocardial Perfusion One Day; Future  -     Adult Transthoracic Echo Complete W/ Cont " if Necessary Per Protocol; Future    2. Primary hypertension  -     Stress Test With Myocardial Perfusion One Day; Future  -     Adult Transthoracic Echo Complete W/ Cont if Necessary Per Protocol; Future    3. Mixed hyperlipidemia  -     rosuvastatin (CRESTOR) 20 MG tablet; Take 1 tablet by mouth Daily.  Dispense: 90 tablet; Refill: 3  -     Stress Test With Myocardial Perfusion One Day; Future  -     Adult Transthoracic Echo Complete W/ Cont if Necessary Per Protocol; Future    Other orders  -     ranolazine (Ranexa) 500 MG 12 hr tablet; Take 1 tablet by mouth 2 (Two) Times a Day.  Dispense: 180 tablet; Refill: 3  -     clopidogrel (PLAVIX) 75 MG tablet; Take 1 tablet by mouth Daily. Discontinue Brilinta  Dispense: 90 tablet; Refill: 3  -     nitroglycerin (NITROSTAT) 0.4 MG SL tablet; Place 1 tablet under the tongue Every 5 (Five) Minutes As Needed for Chest Pain. Take no more than 3 doses in 15 minutes.  Dispense: 25 tablet; Refill: 1      CAD  -2023 stent placement  -November 2023 stress test: Small apical ischemia  - Has had recent angina resolved after 2 Nitrostat, admits to tiring easily and short of breath with exertion.  -Nitrostat prescription updated  -Continue Plavix, aspirin, Crestor, Imdur, ranolazine  -Due to cardiac history, risk, and recent symptoms repeat nuclear stress test and echocardiogram ordered.     S/p mitral valve repair  - Repeat echocardiogram     HTN  -BP stable     Hyperlipidemia  -March labs shows LDL at goal, triglycerides elevated at 272.  -Continue Crestor, diet, co-Q10  -Encouraged improvement of A1c  -Labs with PCP     DM  -A1C 6.9  -Discussed dietary changes that would benefit glucose management.  -Due to being diabetic recommend taking lisinopril daily    Further recommendations based on cardiac test results               Electronically signed by EDONTE Hunter,  June 5, 2025 15:08 EDT    Dictated Utilizing Dragon Dictation: Part of this note may be an electronic  transcription/translation of spoken language to printed text using the Dragon Dictation System.

## 2025-06-11 ENCOUNTER — HOSPITAL ENCOUNTER (OUTPATIENT)
Dept: CARDIOLOGY | Facility: HOSPITAL | Age: 80
Discharge: HOME | End: 2025-06-11
Payer: MEDICARE

## 2025-06-11 ENCOUNTER — RESULTS FOLLOW-UP (OUTPATIENT)
Dept: CARDIOLOGY | Facility: CLINIC | Age: 80
End: 2025-06-11
Payer: MEDICARE

## 2025-06-11 DIAGNOSIS — I25.118 CORONARY ARTERY DISEASE OF NATIVE ARTERY OF NATIVE HEART WITH STABLE ANGINA PECTORIS: ICD-10-CM

## 2025-06-11 DIAGNOSIS — E78.2 MIXED HYPERLIPIDEMIA: ICD-10-CM

## 2025-06-11 DIAGNOSIS — I10 PRIMARY HYPERTENSION: ICD-10-CM

## 2025-06-11 LAB
AV MEAN PRESS GRAD SYS DOP V1V2: 2.7 MMHG
AV VMAX SYS DOP: 99.1 CM/SEC
BH CV ECHO MEAS - ACS: 1.89 CM
BH CV ECHO MEAS - AO MAX PG: 3.9 MMHG
BH CV ECHO MEAS - AO ROOT DIAM: 3.8 CM
BH CV ECHO MEAS - AO V2 VTI: 19.2 CM
BH CV ECHO MEAS - EDV(CUBED): 36.3 ML
BH CV ECHO MEAS - EDV(MOD-SP4): 77.4 ML
BH CV ECHO MEAS - EF(MOD-SP4): 53.5 %
BH CV ECHO MEAS - ESV(CUBED): 3.7 ML
BH CV ECHO MEAS - ESV(MOD-SP4): 36 ML
BH CV ECHO MEAS - FS: 53.5 %
BH CV ECHO MEAS - IVS/LVPW: 0.92 CM
BH CV ECHO MEAS - IVSD: 1.08 CM
BH CV ECHO MEAS - LA DIMENSION: 4 CM
BH CV ECHO MEAS - LAT PEAK E' VEL: 7.8 CM/SEC
BH CV ECHO MEAS - LV DIASTOLIC VOL/BSA (35-75): 38.9 CM2
BH CV ECHO MEAS - LV MASS(C)D: 114.2 GRAMS
BH CV ECHO MEAS - LV SYSTOLIC VOL/BSA (12-30): 18.1 CM2
BH CV ECHO MEAS - LVIDD: 3.3 CM
BH CV ECHO MEAS - LVIDS: 1.54 CM
BH CV ECHO MEAS - LVPWD: 1.18 CM
BH CV ECHO MEAS - MED PEAK E' VEL: 5 CM/SEC
BH CV ECHO MEAS - MV A MAX VEL: 130.2 CM/SEC
BH CV ECHO MEAS - MV DEC SLOPE: 281.9 CM/SEC2
BH CV ECHO MEAS - MV DEC TIME: 0.34 SEC
BH CV ECHO MEAS - MV E MAX VEL: 80.4 CM/SEC
BH CV ECHO MEAS - MV E/A: 0.62
BH CV ECHO MEAS - MV MAX PG: 7.2 MMHG
BH CV ECHO MEAS - MV MEAN PG: 3.1 MMHG
BH CV ECHO MEAS - MV P1/2T: 100.6 MSEC
BH CV ECHO MEAS - MV V2 VTI: 34.7 CM
BH CV ECHO MEAS - MVA(P1/2T): 2.19 CM2
BH CV ECHO MEAS - RAP SYSTOLE: 10 MMHG
BH CV ECHO MEAS - RVDD: 2.7 CM
BH CV ECHO MEAS - RVSP: 31.6 MMHG
BH CV ECHO MEAS - SV(MOD-SP4): 41.4 ML
BH CV ECHO MEAS - SVI(MOD-SP4): 20.8 ML/M2
BH CV ECHO MEAS - TR MAX PG: 21.6 MMHG
BH CV ECHO MEAS - TR MAX VEL: 232.1 CM/SEC
BH CV ECHO MEASUREMENTS AVERAGE E/E' RATIO: 12.56
BH CV REST NUCLEAR ISOTOPE DOSE: 10 MCI
BH CV STRESS COMMENTS STAGE 1: NORMAL
BH CV STRESS DOSE REGADENOSON STAGE 1: 0.4
BH CV STRESS DURATION MIN STAGE 1: 0
BH CV STRESS DURATION SEC STAGE 1: 10
BH CV STRESS NUCLEAR ISOTOPE DOSE: 30 MCI
BH CV STRESS PROTOCOL 1: NORMAL
BH CV STRESS RECOVERY BP: NORMAL MMHG
BH CV STRESS RECOVERY HR: 86 BPM
BH CV STRESS STAGE 1: 1
IVRT: 155 MS
LEFT ATRIUM VOLUME INDEX: 16.8 ML/M2
LV EF 3D SEGMENTATION: 65 %
MAXIMAL PREDICTED HEART RATE: 138 BPM
PERCENT MAX PREDICTED HR: 61.59 %
SPECT HRT GATED+EF W RNC IV: 57 %
STRESS BASELINE BP: NORMAL MMHG
STRESS BASELINE HR: 80 BPM
STRESS PERCENT HR: 72 %
STRESS POST ESTIMATED WORKLOAD: 1 METS
STRESS POST EXERCISE DUR MIN: 2 MIN
STRESS POST PEAK BP: NORMAL MMHG
STRESS POST PEAK HR: 85 BPM
STRESS TARGET HR: 117 BPM

## 2025-06-11 PROCEDURE — 78452 HT MUSCLE IMAGE SPECT MULT: CPT

## 2025-06-11 PROCEDURE — A9500 TC99M SESTAMIBI: HCPCS | Performed by: INTERNAL MEDICINE

## 2025-06-11 PROCEDURE — 93017 CV STRESS TEST TRACING ONLY: CPT

## 2025-06-11 PROCEDURE — 34310000005 TECHNETIUM SESTAMIBI: Performed by: INTERNAL MEDICINE

## 2025-06-11 PROCEDURE — 93306 TTE W/DOPPLER COMPLETE: CPT

## 2025-06-11 PROCEDURE — 25010000002 REGADENOSON 0.4 MG/5ML SOLUTION: Performed by: INTERNAL MEDICINE

## 2025-06-11 RX ORDER — RANOLAZINE 1000 MG/1
1000 TABLET, EXTENDED RELEASE ORAL 2 TIMES DAILY
Qty: 180 TABLET | Refills: 2 | Status: SHIPPED | OUTPATIENT
Start: 2025-06-11

## 2025-06-11 RX ORDER — REGADENOSON 0.08 MG/ML
0.4 INJECTION, SOLUTION INTRAVENOUS
Status: COMPLETED | OUTPATIENT
Start: 2025-06-11 | End: 2025-06-11

## 2025-06-11 RX ADMIN — TECHNETIUM TC 99M SESTAMIBI 1 DOSE: 1 INJECTION INTRAVENOUS at 10:53

## 2025-06-11 RX ADMIN — REGADENOSON 0.4 MG: 0.08 INJECTION, SOLUTION INTRAVENOUS at 10:53

## 2025-06-11 RX ADMIN — TECHNETIUM TC 99M SESTAMIBI 1 DOSE: 1 INJECTION INTRAVENOUS at 09:21

## 2025-06-18 ENCOUNTER — TELEPHONE (OUTPATIENT)
Dept: CARDIOLOGY | Facility: CLINIC | Age: 80
End: 2025-06-18
Payer: MEDICARE

## 2025-06-18 RX ORDER — CLOPIDOGREL BISULFATE 75 MG/1
75 TABLET ORAL DAILY
Qty: 90 TABLET | Refills: 3 | Status: SHIPPED | OUTPATIENT
Start: 2025-06-18

## 2025-06-18 NOTE — TELEPHONE ENCOUNTER
Caller: Jaimee Balbuena    Relationship: Emergency Contact    Best call back number: [phone]    Which medication are you concerned about: RANOLAZINE 1000 MG 2 TIMES DAILY. / CLOPIDOGREL 75 MG DAILY  Who prescribed you this medication: DR. RODRIGUES INCREASED DOSAGE.    When did you start taking this medication:  DOSAGE WAS INCREASED ON 6-14-25 PATIENT STARED NEW DOSAGE    What are your concerns: PATIENT GOT DIZZY AND SICK AT HIS STOMACH AFTER TAKING THIS MEDICATION AND HEART WAS RACING AND BP WENT UP. PATIENT SAID HE FELT LIKE MEDICATION IS TOO STRONG. PATIENT WIFE GAVE HIM A NITRO AND PATIENT LAID DOWN. HIS BP /83 WITH PULSE 59 AT 6:30 PM AND AT 6:50 WAS GIVEN NITRO. NITRO DID HELP. PATIENT REFUSED TO GO TO ER.  PATIENTS WIFE ALSO TRIED TO  HIS CLOPIDOGREL 75 MG AND WAS TOLD THAT THE MEDICATION HAD BEEN DISCONTINUED. PATIENTS WIFE IS NOT AWARE OF IT BEING STOPPED.     How long have you had these concerns:  RANOLAZINE, SINCE 6-15-25 AND YESTERDAY EFFECTS HIT PATIENT HARD.

## 2025-06-18 NOTE — TELEPHONE ENCOUNTER
I spoke with Jaimee  she states that  ranexa 1000 mg twice daily is too strong for him , he had side effects of dizziness , he asked if is ok to go back to ranexa 500 mg BID .

## 2025-06-19 NOTE — TELEPHONE ENCOUNTER
I reviewed his stress test results.   Agree to resume lower dose Ranexa.  If he has more symptoms he may need to proceed with heart cath.

## 2025-06-19 NOTE — TELEPHONE ENCOUNTER
I spoke with Jaimee  and she is aware  Burak can go back to  Ranexa 500 mg twice daily  . She is aware to call if symptoms persist

## 2025-08-15 RX ORDER — RANOLAZINE 500 MG/1
500 TABLET, EXTENDED RELEASE ORAL 2 TIMES DAILY
Qty: 60 TABLET | Refills: 6 | Status: SHIPPED | OUTPATIENT
Start: 2025-08-15